# Patient Record
Sex: FEMALE | Race: WHITE | Employment: OTHER | URBAN - METROPOLITAN AREA
[De-identification: names, ages, dates, MRNs, and addresses within clinical notes are randomized per-mention and may not be internally consistent; named-entity substitution may affect disease eponyms.]

---

## 2017-01-01 ENCOUNTER — HOSPITAL ENCOUNTER (OUTPATIENT)
Dept: GENERAL RADIOLOGY | Age: 72
Discharge: HOME | End: 2017-08-09

## 2017-01-01 DIAGNOSIS — J06.9 ACUTE URI: ICD-10-CM

## 2017-01-01 PROCEDURE — 71020 XR CHEST PA LAT: CPT

## 2018-01-01 ENCOUNTER — HOSPITAL ENCOUNTER (OUTPATIENT)
Dept: GENERAL RADIOLOGY | Age: 73
Discharge: HOME | End: 2018-05-18

## 2018-01-01 ENCOUNTER — APPOINTMENT (OUTPATIENT)
Dept: CT IMAGING | Age: 73
DRG: 834 | End: 2018-01-01
Attending: INTERNAL MEDICINE
Payer: MEDICARE

## 2018-01-01 ENCOUNTER — HOSPITAL ENCOUNTER (OUTPATIENT)
Dept: GENERAL RADIOLOGY | Age: 73
Discharge: HOME | End: 2018-04-30
Payer: MEDICARE

## 2018-01-01 ENCOUNTER — APPOINTMENT (OUTPATIENT)
Dept: GENERAL RADIOLOGY | Age: 73
DRG: 834 | End: 2018-01-01
Attending: INTERNAL MEDICINE
Payer: MEDICARE

## 2018-01-01 ENCOUNTER — HOSPITAL ENCOUNTER (OUTPATIENT)
Dept: GENERAL RADIOLOGY | Age: 73
Discharge: HOME | End: 2018-05-25

## 2018-01-01 ENCOUNTER — HOSPITAL ENCOUNTER (INPATIENT)
Age: 73
LOS: 4 days | DRG: 834 | End: 2018-06-01
Attending: EMERGENCY MEDICINE | Admitting: FAMILY MEDICINE
Payer: MEDICARE

## 2018-01-01 ENCOUNTER — APPOINTMENT (OUTPATIENT)
Dept: GENERAL RADIOLOGY | Age: 73
DRG: 834 | End: 2018-01-01
Attending: HOSPITALIST
Payer: MEDICARE

## 2018-01-01 ENCOUNTER — HOSPICE ADMISSION (OUTPATIENT)
Dept: HOSPICE | Facility: HOSPICE | Age: 73
End: 2018-01-01

## 2018-01-01 ENCOUNTER — APPOINTMENT (OUTPATIENT)
Dept: GENERAL RADIOLOGY | Age: 73
DRG: 834 | End: 2018-01-01
Attending: EMERGENCY MEDICINE
Payer: MEDICARE

## 2018-01-01 ENCOUNTER — APPOINTMENT (OUTPATIENT)
Dept: CT IMAGING | Age: 73
DRG: 834 | End: 2018-01-01
Attending: EMERGENCY MEDICINE
Payer: MEDICARE

## 2018-01-01 VITALS
OXYGEN SATURATION: 77 % | WEIGHT: 174.6 LBS | RESPIRATION RATE: 44 BRPM | BODY MASS INDEX: 25.86 KG/M2 | DIASTOLIC BLOOD PRESSURE: 61 MMHG | HEIGHT: 69 IN | HEART RATE: 106 BPM | SYSTOLIC BLOOD PRESSURE: 104 MMHG | TEMPERATURE: 98.1 F

## 2018-01-01 DIAGNOSIS — M54.9 BACK PAIN: ICD-10-CM

## 2018-01-01 DIAGNOSIS — R06.02 SHORTNESS OF BREATH: ICD-10-CM

## 2018-01-01 DIAGNOSIS — Z51.5 DYING CARE: ICD-10-CM

## 2018-01-01 DIAGNOSIS — J18.9 PNEUMONIA OF RIGHT UPPER LOBE DUE TO INFECTIOUS ORGANISM: ICD-10-CM

## 2018-01-01 DIAGNOSIS — C95.92 LEUKEMIA IN RELAPSE, UNSPECIFIED LEUKEMIA TYPE (HCC): ICD-10-CM

## 2018-01-01 DIAGNOSIS — R45.1 AGITATION: ICD-10-CM

## 2018-01-01 DIAGNOSIS — M54.2 NECK PAIN: ICD-10-CM

## 2018-01-01 DIAGNOSIS — R07.9 CHEST PAIN: ICD-10-CM

## 2018-01-01 DIAGNOSIS — R07.9 CHEST PAIN, UNSPECIFIED TYPE: Primary | ICD-10-CM

## 2018-01-01 LAB
ABO + RH BLD: NORMAL
ALBUMIN SERPL-MCNC: 3.2 G/DL (ref 3.5–5)
ALBUMIN SERPL-MCNC: 3.6 G/DL (ref 3.5–5)
ALBUMIN/GLOB SERPL: 1 {RATIO} (ref 1.1–2.2)
ALBUMIN/GLOB SERPL: 1.1 {RATIO} (ref 1.1–2.2)
ALP SERPL-CCNC: 100 U/L (ref 45–117)
ALP SERPL-CCNC: 132 U/L (ref 45–117)
ALT SERPL-CCNC: 40 U/L (ref 12–78)
ALT SERPL-CCNC: 55 U/L (ref 12–78)
ANION GAP SERPL CALC-SCNC: 10 MMOL/L (ref 5–15)
ANION GAP SERPL CALC-SCNC: 11 MMOL/L (ref 5–15)
ANION GAP SERPL CALC-SCNC: 11 MMOL/L (ref 5–15)
ANION GAP SERPL CALC-SCNC: 12 MMOL/L (ref 5–15)
ANION GAP SERPL CALC-SCNC: 15 MMOL/L (ref 5–15)
APTT PPP: 27.5 SEC (ref 22.1–32)
ARTERIAL PATENCY WRIST A: YES
AST SERPL-CCNC: 33 U/L (ref 15–37)
AST SERPL-CCNC: 56 U/L (ref 15–37)
ATRIAL RATE: 74 BPM
BASE EXCESS BLD CALC-SCNC: 0 MMOL/L
BASOPHILS # BLD: 0 K/UL (ref 0–0.1)
BASOPHILS # BLD: 0.1 K/UL (ref 0–0.1)
BASOPHILS NFR BLD: 0 % (ref 0–1)
BASOPHILS NFR BLD: 1 % (ref 0–1)
BDY SITE: ABNORMAL
BILIRUB SERPL-MCNC: 0.4 MG/DL (ref 0.2–1)
BILIRUB SERPL-MCNC: 0.7 MG/DL (ref 0.2–1)
BLASTS NFR BLD MANUAL: 10 %
BLASTS NFR BLD MANUAL: 11 %
BLASTS NFR BLD MANUAL: 12 %
BLASTS NFR BLD MANUAL: 14 %
BLASTS NFR BLD MANUAL: 17 %
BLASTS NFR BLD MANUAL: 3 %
BLASTS NFR BLD MANUAL: 5 %
BLD PROD TYP BPU: NORMAL
BLD PROD TYP BPU: NORMAL
BLOOD GROUP ANTIBODIES SERPL: NORMAL
BPU ID: NORMAL
BPU ID: NORMAL
BUN SERPL-MCNC: 19 MG/DL (ref 6–20)
BUN SERPL-MCNC: 21 MG/DL (ref 6–20)
BUN SERPL-MCNC: 27 MG/DL (ref 6–20)
BUN SERPL-MCNC: 32 MG/DL (ref 6–20)
BUN SERPL-MCNC: 40 MG/DL (ref 6–20)
BUN/CREAT SERPL: 29 (ref 12–20)
BUN/CREAT SERPL: 30 (ref 12–20)
BUN/CREAT SERPL: 40 (ref 12–20)
BUN/CREAT SERPL: 41 (ref 12–20)
BUN/CREAT SERPL: 50 (ref 12–20)
CALCIUM SERPL-MCNC: 8.2 MG/DL (ref 8.5–10.1)
CALCIUM SERPL-MCNC: 8.4 MG/DL (ref 8.5–10.1)
CALCIUM SERPL-MCNC: 8.5 MG/DL (ref 8.5–10.1)
CALCIUM SERPL-MCNC: 8.6 MG/DL (ref 8.5–10.1)
CALCIUM SERPL-MCNC: 8.8 MG/DL (ref 8.5–10.1)
CALCULATED P AXIS, ECG09: 43 DEGREES
CALCULATED R AXIS, ECG10: -10 DEGREES
CALCULATED T AXIS, ECG11: 18 DEGREES
CHLORIDE SERPL-SCNC: 101 MMOL/L (ref 97–108)
CHLORIDE SERPL-SCNC: 101 MMOL/L (ref 97–108)
CHLORIDE SERPL-SCNC: 104 MMOL/L (ref 97–108)
CHLORIDE SERPL-SCNC: 104 MMOL/L (ref 97–108)
CHLORIDE SERPL-SCNC: 99 MMOL/L (ref 97–108)
CK MB CFR SERPL CALC: 5.9 % (ref 0–2.5)
CK MB CFR SERPL CALC: 9.1 % (ref 0–2.5)
CK MB CFR SERPL CALC: ABNORMAL % (ref 0–2.5)
CK MB SERPL-MCNC: 19.8 NG/ML (ref 5–25)
CK MB SERPL-MCNC: 7.1 NG/ML (ref 5–25)
CK MB SERPL-MCNC: <1 NG/ML (ref 5–25)
CK SERPL-CCNC: 120 U/L (ref 26–192)
CK SERPL-CCNC: 21 U/L (ref 26–192)
CK SERPL-CCNC: 218 U/L (ref 26–192)
CO2 SERPL-SCNC: 22 MMOL/L (ref 21–32)
CO2 SERPL-SCNC: 23 MMOL/L (ref 21–32)
CO2 SERPL-SCNC: 23 MMOL/L (ref 21–32)
CO2 SERPL-SCNC: 24 MMOL/L (ref 21–32)
CO2 SERPL-SCNC: 25 MMOL/L (ref 21–32)
CREAT SERPL-MCNC: 0.64 MG/DL (ref 0.55–1.02)
CREAT SERPL-MCNC: 0.65 MG/DL (ref 0.55–1.02)
CREAT SERPL-MCNC: 0.68 MG/DL (ref 0.55–1.02)
CREAT SERPL-MCNC: 0.7 MG/DL (ref 0.55–1.02)
CREAT SERPL-MCNC: 0.97 MG/DL (ref 0.55–1.02)
D DIMER PPP FEU-MCNC: 5.23 MG/L FEU (ref 0–0.65)
DIAGNOSIS, 93000: NORMAL
DIFFERENTIAL METHOD BLD: ABNORMAL
EOSINOPHIL # BLD: 0 K/UL (ref 0–0.4)
EOSINOPHIL # BLD: 0.1 K/UL (ref 0–0.4)
EOSINOPHIL # BLD: 0.1 K/UL (ref 0–0.4)
EOSINOPHIL NFR BLD: 0 % (ref 0–7)
EOSINOPHIL NFR BLD: 1 % (ref 0–7)
EOSINOPHIL NFR BLD: 2 % (ref 0–7)
ERYTHROCYTE [DISTWIDTH] IN BLOOD BY AUTOMATED COUNT: 16.9 % (ref 11.5–14.5)
ERYTHROCYTE [DISTWIDTH] IN BLOOD BY AUTOMATED COUNT: 17 % (ref 11.5–14.5)
ERYTHROCYTE [DISTWIDTH] IN BLOOD BY AUTOMATED COUNT: 17.1 % (ref 11.5–14.5)
ERYTHROCYTE [DISTWIDTH] IN BLOOD BY AUTOMATED COUNT: 17.2 % (ref 11.5–14.5)
ERYTHROCYTE [DISTWIDTH] IN BLOOD BY AUTOMATED COUNT: 17.6 % (ref 11.5–14.5)
ERYTHROCYTE [DISTWIDTH] IN BLOOD BY AUTOMATED COUNT: 17.6 % (ref 11.5–14.5)
ERYTHROCYTE [DISTWIDTH] IN BLOOD BY AUTOMATED COUNT: 17.9 % (ref 11.5–14.5)
FIBRINOGEN PPP-MCNC: 675 MG/DL (ref 200–475)
FOLATE SERPL-MCNC: 5.1 NG/ML (ref 5–21)
GAS FLOW.O2 O2 DELIVERY SYS: ABNORMAL L/MIN
GLOBULIN SER CALC-MCNC: 3.2 G/DL (ref 2–4)
GLOBULIN SER CALC-MCNC: 3.3 G/DL (ref 2–4)
GLUCOSE BLD STRIP.AUTO-MCNC: 164 MG/DL (ref 65–100)
GLUCOSE BLD STRIP.AUTO-MCNC: 181 MG/DL (ref 65–100)
GLUCOSE BLD STRIP.AUTO-MCNC: 185 MG/DL (ref 65–100)
GLUCOSE BLD STRIP.AUTO-MCNC: 187 MG/DL (ref 65–100)
GLUCOSE BLD STRIP.AUTO-MCNC: 188 MG/DL (ref 65–100)
GLUCOSE BLD STRIP.AUTO-MCNC: 195 MG/DL (ref 65–100)
GLUCOSE BLD STRIP.AUTO-MCNC: 197 MG/DL (ref 65–100)
GLUCOSE BLD STRIP.AUTO-MCNC: 199 MG/DL (ref 65–100)
GLUCOSE BLD STRIP.AUTO-MCNC: 202 MG/DL (ref 65–100)
GLUCOSE BLD STRIP.AUTO-MCNC: 210 MG/DL (ref 65–100)
GLUCOSE BLD STRIP.AUTO-MCNC: 211 MG/DL (ref 65–100)
GLUCOSE BLD STRIP.AUTO-MCNC: 218 MG/DL (ref 65–100)
GLUCOSE BLD STRIP.AUTO-MCNC: 219 MG/DL (ref 65–100)
GLUCOSE BLD STRIP.AUTO-MCNC: 225 MG/DL (ref 65–100)
GLUCOSE BLD STRIP.AUTO-MCNC: 228 MG/DL (ref 65–100)
GLUCOSE BLD STRIP.AUTO-MCNC: 241 MG/DL (ref 65–100)
GLUCOSE BLD STRIP.AUTO-MCNC: 252 MG/DL (ref 65–100)
GLUCOSE SERPL-MCNC: 158 MG/DL (ref 65–100)
GLUCOSE SERPL-MCNC: 180 MG/DL (ref 65–100)
GLUCOSE SERPL-MCNC: 189 MG/DL (ref 65–100)
GLUCOSE SERPL-MCNC: 195 MG/DL (ref 65–100)
GLUCOSE SERPL-MCNC: 202 MG/DL (ref 65–100)
HCO3 BLD-SCNC: 23.8 MMOL/L (ref 22–26)
HCT VFR BLD AUTO: 24.3 % (ref 35–47)
HCT VFR BLD AUTO: 25.5 % (ref 35–47)
HCT VFR BLD AUTO: 27.8 % (ref 35–47)
HCT VFR BLD AUTO: 28.6 % (ref 35–47)
HCT VFR BLD AUTO: 28.7 % (ref 35–47)
HCT VFR BLD AUTO: 32.4 % (ref 35–47)
HCT VFR BLD AUTO: 32.4 % (ref 35–47)
HGB BLD-MCNC: 10.5 G/DL (ref 11.5–16)
HGB BLD-MCNC: 10.9 G/DL (ref 11.5–16)
HGB BLD-MCNC: 7.9 G/DL (ref 11.5–16)
HGB BLD-MCNC: 8.3 G/DL (ref 11.5–16)
HGB BLD-MCNC: 9.1 G/DL (ref 11.5–16)
HGB BLD-MCNC: 9.4 G/DL (ref 11.5–16)
HGB BLD-MCNC: 9.4 G/DL (ref 11.5–16)
IMM GRANULOCYTES # BLD: 0 K/UL
IMM GRANULOCYTES NFR BLD AUTO: 0 %
INR PPP: 1.3 (ref 0.9–1.1)
IRON SATN MFR SERPL: 32 % (ref 20–50)
IRON SERPL-MCNC: 88 UG/DL (ref 35–150)
LACTATE SERPL-SCNC: 1.9 MMOL/L (ref 0.4–2)
LACTATE SERPL-SCNC: 2.1 MMOL/L (ref 0.4–2)
LDH SERPL L TO P-CCNC: 917 U/L (ref 81–246)
LYMPHOCYTES # BLD: 1.3 K/UL (ref 0.8–3.5)
LYMPHOCYTES # BLD: 1.4 K/UL (ref 0.8–3.5)
LYMPHOCYTES # BLD: 1.5 K/UL (ref 0.8–3.5)
LYMPHOCYTES # BLD: 1.9 K/UL (ref 0.8–3.5)
LYMPHOCYTES # BLD: 2.2 K/UL (ref 0.8–3.5)
LYMPHOCYTES NFR BLD: 13 % (ref 12–49)
LYMPHOCYTES NFR BLD: 14 % (ref 12–49)
LYMPHOCYTES NFR BLD: 14 % (ref 12–49)
LYMPHOCYTES NFR BLD: 18 % (ref 12–49)
LYMPHOCYTES NFR BLD: 24 % (ref 12–49)
LYMPHOCYTES NFR BLD: 27 % (ref 12–49)
LYMPHOCYTES NFR BLD: 30 % (ref 12–49)
MAGNESIUM SERPL-MCNC: 1.7 MG/DL (ref 1.6–2.4)
MAGNESIUM SERPL-MCNC: 1.7 MG/DL (ref 1.6–2.4)
MCH RBC QN AUTO: 29 PG (ref 26–34)
MCH RBC QN AUTO: 29.2 PG (ref 26–34)
MCH RBC QN AUTO: 29.3 PG (ref 26–34)
MCH RBC QN AUTO: 29.4 PG (ref 26–34)
MCH RBC QN AUTO: 29.5 PG (ref 26–34)
MCH RBC QN AUTO: 29.6 PG (ref 26–34)
MCH RBC QN AUTO: 29.8 PG (ref 26–34)
MCHC RBC AUTO-ENTMCNC: 32.4 G/DL (ref 30–36.5)
MCHC RBC AUTO-ENTMCNC: 32.5 G/DL (ref 30–36.5)
MCHC RBC AUTO-ENTMCNC: 32.5 G/DL (ref 30–36.5)
MCHC RBC AUTO-ENTMCNC: 32.7 G/DL (ref 30–36.5)
MCHC RBC AUTO-ENTMCNC: 32.8 G/DL (ref 30–36.5)
MCHC RBC AUTO-ENTMCNC: 32.9 G/DL (ref 30–36.5)
MCHC RBC AUTO-ENTMCNC: 33.6 G/DL (ref 30–36.5)
MCV RBC AUTO: 87.8 FL (ref 80–99)
MCV RBC AUTO: 88.3 FL (ref 80–99)
MCV RBC AUTO: 89.7 FL (ref 80–99)
MCV RBC AUTO: 90.1 FL (ref 80–99)
MCV RBC AUTO: 90.3 FL (ref 80–99)
MCV RBC AUTO: 90.3 FL (ref 80–99)
MCV RBC AUTO: 91.7 FL (ref 80–99)
METAMYELOCYTES NFR BLD MANUAL: 1 %
METAMYELOCYTES NFR BLD MANUAL: 1 %
MONOCYTES # BLD: 2.7 K/UL (ref 0–1)
MONOCYTES # BLD: 2.7 K/UL (ref 0–1)
MONOCYTES # BLD: 3.4 K/UL (ref 0–1)
MONOCYTES # BLD: 5.2 K/UL (ref 0–1)
MONOCYTES # BLD: 5.4 K/UL (ref 0–1)
MONOCYTES # BLD: 5.8 K/UL (ref 0–1)
MONOCYTES # BLD: 7.4 K/UL (ref 0–1)
MONOCYTES NFR BLD: 46 % (ref 5–13)
MONOCYTES NFR BLD: 47 % (ref 5–13)
MONOCYTES NFR BLD: 49 % (ref 5–13)
MONOCYTES NFR BLD: 50 % (ref 5–13)
MONOCYTES NFR BLD: 56 % (ref 5–13)
MONOCYTES NFR BLD: 59 % (ref 5–13)
MONOCYTES NFR BLD: 73 % (ref 5–13)
MYELOCYTES NFR BLD MANUAL: 1 %
MYELOCYTES NFR BLD MANUAL: 2 %
MYELOCYTES NFR BLD MANUAL: 3 %
NEUTS BAND NFR BLD MANUAL: 1 % (ref 0–6)
NEUTS BAND NFR BLD MANUAL: 2 % (ref 0–6)
NEUTS BAND NFR BLD MANUAL: 4 % (ref 0–6)
NEUTS SEG # BLD: 0.5 K/UL (ref 1.8–8)
NEUTS SEG # BLD: 0.7 K/UL (ref 1.8–8)
NEUTS SEG # BLD: 1 K/UL (ref 1.8–8)
NEUTS SEG # BLD: 1.1 K/UL (ref 1.8–8)
NEUTS SEG # BLD: 1.1 K/UL (ref 1.8–8)
NEUTS SEG NFR BLD: 10 % (ref 32–75)
NEUTS SEG NFR BLD: 15 % (ref 32–75)
NEUTS SEG NFR BLD: 6 % (ref 32–75)
NEUTS SEG NFR BLD: 7 % (ref 32–75)
NEUTS SEG NFR BLD: 7 % (ref 32–75)
NEUTS SEG NFR BLD: 8 % (ref 32–75)
NEUTS SEG NFR BLD: 9 % (ref 32–75)
NRBC # BLD: 0.02 K/UL (ref 0–0.01)
NRBC # BLD: 0.03 K/UL (ref 0–0.01)
NRBC # BLD: 0.04 K/UL (ref 0–0.01)
NRBC # BLD: 0.06 K/UL (ref 0–0.01)
NRBC BLD-RTO: 0.2 PER 100 WBC
NRBC BLD-RTO: 0.3 PER 100 WBC
NRBC BLD-RTO: 0.4 PER 100 WBC
NRBC BLD-RTO: 0.5 PER 100 WBC
NRBC BLD-RTO: 1 PER 100 WBC
O2/TOTAL GAS SETTING VFR VENT: 0.5 %
P-R INTERVAL, ECG05: 146 MS
PATH REV BLD -IMP: ABNORMAL
PCO2 BLD: 32.7 MMHG (ref 35–45)
PEEP RESPIRATORY: 5 CMH2O
PH BLD: 7.47 [PH] (ref 7.35–7.45)
PHOSPHATE SERPL-MCNC: 3.7 MG/DL (ref 2.6–4.7)
PLATELET # BLD AUTO: 21 K/UL (ref 150–400)
PLATELET # BLD AUTO: 21 K/UL (ref 150–400)
PLATELET # BLD AUTO: 31 K/UL (ref 150–400)
PLATELET # BLD AUTO: 36 K/UL (ref 150–400)
PLATELET # BLD AUTO: 40 K/UL (ref 150–400)
PLATELET # BLD AUTO: 53 K/UL (ref 150–400)
PLATELET # BLD AUTO: 58 K/UL (ref 150–400)
PMV BLD AUTO: 10.3 FL (ref 8.9–12.9)
PMV BLD AUTO: 10.3 FL (ref 8.9–12.9)
PMV BLD AUTO: 10.5 FL (ref 8.9–12.9)
PMV BLD AUTO: 10.9 FL (ref 8.9–12.9)
PMV BLD AUTO: 11.1 FL (ref 8.9–12.9)
PMV BLD AUTO: 12.1 FL (ref 8.9–12.9)
PMV BLD AUTO: 12.4 FL (ref 8.9–12.9)
PO2 BLD: 55 MMHG (ref 80–100)
POTASSIUM SERPL-SCNC: 3.3 MMOL/L (ref 3.5–5.1)
POTASSIUM SERPL-SCNC: 3.7 MMOL/L (ref 3.5–5.1)
POTASSIUM SERPL-SCNC: 3.8 MMOL/L (ref 3.5–5.1)
POTASSIUM SERPL-SCNC: 3.9 MMOL/L (ref 3.5–5.1)
POTASSIUM SERPL-SCNC: 4.1 MMOL/L (ref 3.5–5.1)
PROMYELOCYTES NFR BLD MANUAL: 2 %
PROMYELOCYTES NFR BLD MANUAL: 3 %
PROMYELOCYTES NFR BLD MANUAL: 7 %
PROT SERPL-MCNC: 6.4 G/DL (ref 6.4–8.2)
PROT SERPL-MCNC: 6.9 G/DL (ref 6.4–8.2)
PROTHROMBIN TIME: 13.4 SEC (ref 9–11.1)
Q-T INTERVAL, ECG07: 388 MS
QRS DURATION, ECG06: 80 MS
QTC CALCULATION (BEZET), ECG08: 430 MS
RBC # BLD AUTO: 2.65 M/UL (ref 3.8–5.2)
RBC # BLD AUTO: 2.83 M/UL (ref 3.8–5.2)
RBC # BLD AUTO: 3.1 M/UL (ref 3.8–5.2)
RBC # BLD AUTO: 3.18 M/UL (ref 3.8–5.2)
RBC # BLD AUTO: 3.24 M/UL (ref 3.8–5.2)
RBC # BLD AUTO: 3.59 M/UL (ref 3.8–5.2)
RBC # BLD AUTO: 3.69 M/UL (ref 3.8–5.2)
RBC MORPH BLD: ABNORMAL
SAO2 % BLD: 90 % (ref 92–97)
SERVICE CMNT-IMP: ABNORMAL
SODIUM SERPL-SCNC: 134 MMOL/L (ref 136–145)
SODIUM SERPL-SCNC: 134 MMOL/L (ref 136–145)
SODIUM SERPL-SCNC: 138 MMOL/L (ref 136–145)
SODIUM SERPL-SCNC: 139 MMOL/L (ref 136–145)
SODIUM SERPL-SCNC: 140 MMOL/L (ref 136–145)
SPECIMEN EXP DATE BLD: NORMAL
SPECIMEN TYPE: ABNORMAL
STATUS OF UNIT,%ST: NORMAL
STATUS OF UNIT,%ST: NORMAL
T4 FREE SERPL-MCNC: 1.3 NG/DL (ref 0.8–1.5)
THERAPEUTIC RANGE,PTTT: NORMAL SECS (ref 58–77)
TIBC SERPL-MCNC: 275 UG/DL (ref 250–450)
TOTAL RESP. RATE, ITRR: 22
TROPONIN I SERPL-MCNC: 3.16 NG/ML
TROPONIN I SERPL-MCNC: 6.54 NG/ML
TROPONIN I SERPL-MCNC: <0.04 NG/ML
TROPONIN I SERPL-MCNC: <0.04 NG/ML
TSH SERPL DL<=0.05 MIU/L-ACNC: 0.48 UIU/ML (ref 0.36–3.74)
UNIT DIVISION, %UDIV: 0
UNIT DIVISION, %UDIV: 0
VENTRICULAR RATE, ECG03: 74 BPM
VIT B12 SERPL-MCNC: 500 PG/ML (ref 193–986)
WBC # BLD AUTO: 10.2 K/UL (ref 3.6–11)
WBC # BLD AUTO: 10.3 K/UL (ref 3.6–11)
WBC # BLD AUTO: 5.5 K/UL (ref 3.6–11)
WBC # BLD AUTO: 5.8 K/UL (ref 3.6–11)
WBC # BLD AUTO: 7.3 K/UL (ref 3.6–11)
WBC # BLD AUTO: 9.7 K/UL (ref 3.6–11)
WBC # BLD AUTO: 9.9 K/UL (ref 3.6–11)
WBC MORPH BLD: ABNORMAL

## 2018-01-01 PROCEDURE — 74011000258 HC RX REV CODE- 258: Performed by: EMERGENCY MEDICINE

## 2018-01-01 PROCEDURE — 74011000258 HC RX REV CODE- 258: Performed by: HOSPITALIST

## 2018-01-01 PROCEDURE — 74011636637 HC RX REV CODE- 636/637: Performed by: FAMILY MEDICINE

## 2018-01-01 PROCEDURE — 82962 GLUCOSE BLOOD TEST: CPT

## 2018-01-01 PROCEDURE — 74011250637 HC RX REV CODE- 250/637: Performed by: INTERNAL MEDICINE

## 2018-01-01 PROCEDURE — 88374 M/PHMTRC ALYS ISHQUANT/SEMIQ: CPT | Performed by: FAMILY MEDICINE

## 2018-01-01 PROCEDURE — 82607 VITAMIN B-12: CPT | Performed by: INTERNAL MEDICINE

## 2018-01-01 PROCEDURE — 87040 BLOOD CULTURE FOR BACTERIA: CPT | Performed by: HOSPITALIST

## 2018-01-01 PROCEDURE — 30233R1 TRANSFUSION OF NONAUTOLOGOUS PLATELETS INTO PERIPHERAL VEIN, PERCUTANEOUS APPROACH: ICD-10-PCS | Performed by: INTERNAL MEDICINE

## 2018-01-01 PROCEDURE — 36415 COLL VENOUS BLD VENIPUNCTURE: CPT | Performed by: FAMILY MEDICINE

## 2018-01-01 PROCEDURE — 88264 CHROMOSOME ANALYSIS 20-25: CPT | Performed by: FAMILY MEDICINE

## 2018-01-01 PROCEDURE — 36569 INSJ PICC 5 YR+ W/O IMAGING: CPT | Performed by: HOSPITALIST

## 2018-01-01 PROCEDURE — 74011000250 HC RX REV CODE- 250: Performed by: INTERNAL MEDICINE

## 2018-01-01 PROCEDURE — 85025 COMPLETE CBC W/AUTO DIFF WBC: CPT | Performed by: FAMILY MEDICINE

## 2018-01-01 PROCEDURE — 74011250637 HC RX REV CODE- 250/637: Performed by: HOSPITALIST

## 2018-01-01 PROCEDURE — 71046 X-RAY EXAM CHEST 2 VIEWS: CPT

## 2018-01-01 PROCEDURE — 77030027138 HC INCENT SPIROMETER -A

## 2018-01-01 PROCEDURE — 99285 EMERGENCY DEPT VISIT HI MDM: CPT

## 2018-01-01 PROCEDURE — 71275 CT ANGIOGRAPHY CHEST: CPT

## 2018-01-01 PROCEDURE — 88342 IMHCHEM/IMCYTCHM 1ST ANTB: CPT | Performed by: FAMILY MEDICINE

## 2018-01-01 PROCEDURE — 36415 COLL VENOUS BLD VENIPUNCTURE: CPT | Performed by: INTERNAL MEDICINE

## 2018-01-01 PROCEDURE — 84484 ASSAY OF TROPONIN QUANT: CPT | Performed by: HOSPITALIST

## 2018-01-01 PROCEDURE — 74011250636 HC RX REV CODE- 250/636: Performed by: INTERNAL MEDICINE

## 2018-01-01 PROCEDURE — 93306 TTE W/DOPPLER COMPLETE: CPT

## 2018-01-01 PROCEDURE — 77030028872 HC BN BIOP NDL ON CNTRL TY TELE -C

## 2018-01-01 PROCEDURE — 93041 RHYTHM ECG TRACING: CPT

## 2018-01-01 PROCEDURE — 74011250636 HC RX REV CODE- 250/636: Performed by: FAMILY MEDICINE

## 2018-01-01 PROCEDURE — 86900 BLOOD TYPING SEROLOGIC ABO: CPT | Performed by: INTERNAL MEDICINE

## 2018-01-01 PROCEDURE — 96376 TX/PRO/DX INJ SAME DRUG ADON: CPT

## 2018-01-01 PROCEDURE — 88311 DECALCIFY TISSUE: CPT | Performed by: FAMILY MEDICINE

## 2018-01-01 PROCEDURE — 88237 TISSUE CULTURE BONE MARROW: CPT | Performed by: FAMILY MEDICINE

## 2018-01-01 PROCEDURE — 84484 ASSAY OF TROPONIN QUANT: CPT | Performed by: INTERNAL MEDICINE

## 2018-01-01 PROCEDURE — P9047 ALBUMIN (HUMAN), 25%, 50ML: HCPCS | Performed by: HOSPITALIST

## 2018-01-01 PROCEDURE — 74011250636 HC RX REV CODE- 250/636: Performed by: HOSPITALIST

## 2018-01-01 PROCEDURE — 81245 FLT3 GENE: CPT | Performed by: FAMILY MEDICINE

## 2018-01-01 PROCEDURE — 85025 COMPLETE CBC W/AUTO DIFF WBC: CPT | Performed by: INTERNAL MEDICINE

## 2018-01-01 PROCEDURE — 74011250637 HC RX REV CODE- 250/637: Performed by: EMERGENCY MEDICINE

## 2018-01-01 PROCEDURE — 77030020847 HC STATLOK BARD -A

## 2018-01-01 PROCEDURE — 80048 BASIC METABOLIC PNL TOTAL CA: CPT | Performed by: INTERNAL MEDICINE

## 2018-01-01 PROCEDURE — 88185 FLOWCYTOMETRY/TC ADD-ON: CPT | Performed by: FAMILY MEDICINE

## 2018-01-01 PROCEDURE — 74011250636 HC RX REV CODE- 250/636: Performed by: EMERGENCY MEDICINE

## 2018-01-01 PROCEDURE — 85384 FIBRINOGEN ACTIVITY: CPT | Performed by: INTERNAL MEDICINE

## 2018-01-01 PROCEDURE — 88305 TISSUE EXAM BY PATHOLOGIST: CPT | Performed by: FAMILY MEDICINE

## 2018-01-01 PROCEDURE — 72050 X-RAY EXAM NECK SPINE 4/5VWS: CPT

## 2018-01-01 PROCEDURE — 83735 ASSAY OF MAGNESIUM: CPT | Performed by: FAMILY MEDICINE

## 2018-01-01 PROCEDURE — 85730 THROMBOPLASTIN TIME PARTIAL: CPT | Performed by: INTERNAL MEDICINE

## 2018-01-01 PROCEDURE — 77012 CT SCAN FOR NEEDLE BIOPSY: CPT

## 2018-01-01 PROCEDURE — 65270000032 HC RM SEMIPRIVATE

## 2018-01-01 PROCEDURE — 84439 ASSAY OF FREE THYROXINE: CPT | Performed by: INTERNAL MEDICINE

## 2018-01-01 PROCEDURE — 93306 TTE W/DOPPLER COMPLETE: CPT | Performed by: INTERNAL MEDICINE

## 2018-01-01 PROCEDURE — 88280 CHROMOSOME KARYOTYPE STUDY: CPT | Performed by: FAMILY MEDICINE

## 2018-01-01 PROCEDURE — 77030032490 HC SLV COMPR SCD KNE COVD -B

## 2018-01-01 PROCEDURE — 82550 ASSAY OF CK (CPK): CPT | Performed by: HOSPITALIST

## 2018-01-01 PROCEDURE — 85379 FIBRIN DEGRADATION QUANT: CPT | Performed by: EMERGENCY MEDICINE

## 2018-01-01 PROCEDURE — 36415 COLL VENOUS BLD VENIPUNCTURE: CPT | Performed by: HOSPITALIST

## 2018-01-01 PROCEDURE — 80053 COMPREHEN METABOLIC PANEL: CPT | Performed by: FAMILY MEDICINE

## 2018-01-01 PROCEDURE — 36600 WITHDRAWAL OF ARTERIAL BLOOD: CPT

## 2018-01-01 PROCEDURE — 94660 CPAP INITIATION&MGMT: CPT

## 2018-01-01 PROCEDURE — 71100 X-RAY EXAM RIBS UNI 2 VIEWS: CPT

## 2018-01-01 PROCEDURE — 83735 ASSAY OF MAGNESIUM: CPT | Performed by: INTERNAL MEDICINE

## 2018-01-01 PROCEDURE — 74011250637 HC RX REV CODE- 250/637: Performed by: FAMILY MEDICINE

## 2018-01-01 PROCEDURE — 74011250636 HC RX REV CODE- 250/636: Performed by: RADIOLOGY

## 2018-01-01 PROCEDURE — 74011000250 HC RX REV CODE- 250: Performed by: HOSPITALIST

## 2018-01-01 PROCEDURE — 88184 FLOWCYTOMETRY/ TC 1 MARKER: CPT | Performed by: FAMILY MEDICINE

## 2018-01-01 PROCEDURE — 84443 ASSAY THYROID STIM HORMONE: CPT | Performed by: INTERNAL MEDICINE

## 2018-01-01 PROCEDURE — 85025 COMPLETE CBC W/AUTO DIFF WBC: CPT | Performed by: EMERGENCY MEDICINE

## 2018-01-01 PROCEDURE — 81246 FLT3 GENE ANALYSIS: CPT | Performed by: FAMILY MEDICINE

## 2018-01-01 PROCEDURE — 74011250636 HC RX REV CODE- 250/636: Performed by: NURSE PRACTITIONER

## 2018-01-01 PROCEDURE — 83605 ASSAY OF LACTIC ACID: CPT | Performed by: HOSPITALIST

## 2018-01-01 PROCEDURE — 72100 X-RAY EXAM L-S SPINE 2/3 VWS: CPT

## 2018-01-01 PROCEDURE — 81310 NPM1 GENE: CPT | Performed by: FAMILY MEDICINE

## 2018-01-01 PROCEDURE — 77030003666 HC NDL SPINAL BD -A

## 2018-01-01 PROCEDURE — 71045 X-RAY EXAM CHEST 1 VIEW: CPT

## 2018-01-01 PROCEDURE — 82746 ASSAY OF FOLIC ACID SERUM: CPT | Performed by: INTERNAL MEDICINE

## 2018-01-01 PROCEDURE — 74011000258 HC RX REV CODE- 258: Performed by: INTERNAL MEDICINE

## 2018-01-01 PROCEDURE — 80053 COMPREHEN METABOLIC PANEL: CPT | Performed by: EMERGENCY MEDICINE

## 2018-01-01 PROCEDURE — 82550 ASSAY OF CK (CPK): CPT | Performed by: EMERGENCY MEDICINE

## 2018-01-01 PROCEDURE — 88313 SPECIAL STAINS GROUP 2: CPT | Performed by: FAMILY MEDICINE

## 2018-01-01 PROCEDURE — 84100 ASSAY OF PHOSPHORUS: CPT | Performed by: HOSPITALIST

## 2018-01-01 PROCEDURE — 83540 ASSAY OF IRON: CPT | Performed by: FAMILY MEDICINE

## 2018-01-01 PROCEDURE — 83615 LACTATE (LD) (LDH) ENZYME: CPT | Performed by: INTERNAL MEDICINE

## 2018-01-01 PROCEDURE — 77030018719 HC DRSG PTCH ANTIMIC J&J -A

## 2018-01-01 PROCEDURE — C1751 CATH, INF, PER/CENT/MIDLINE: HCPCS

## 2018-01-01 PROCEDURE — 93005 ELECTROCARDIOGRAM TRACING: CPT

## 2018-01-01 PROCEDURE — 5A09357 ASSISTANCE WITH RESPIRATORY VENTILATION, LESS THAN 24 CONSECUTIVE HOURS, CONTINUOUS POSITIVE AIRWAY PRESSURE: ICD-10-PCS | Performed by: INTERNAL MEDICINE

## 2018-01-01 PROCEDURE — 76937 US GUIDE VASCULAR ACCESS: CPT

## 2018-01-01 PROCEDURE — 82550 ASSAY OF CK (CPK): CPT | Performed by: INTERNAL MEDICINE

## 2018-01-01 PROCEDURE — P9035 PLATELET PHERES LEUKOREDUCED: HCPCS | Performed by: INTERNAL MEDICINE

## 2018-01-01 PROCEDURE — 07DR3ZX EXTRACTION OF ILIAC BONE MARROW, PERCUTANEOUS APPROACH, DIAGNOSTIC: ICD-10-PCS | Performed by: RADIOLOGY

## 2018-01-01 PROCEDURE — 02HV33Z INSERTION OF INFUSION DEVICE INTO SUPERIOR VENA CAVA, PERCUTANEOUS APPROACH: ICD-10-PCS | Performed by: INTERNAL MEDICINE

## 2018-01-01 PROCEDURE — 36430 TRANSFUSION BLD/BLD COMPNT: CPT

## 2018-01-01 PROCEDURE — 65610000006 HC RM INTENSIVE CARE

## 2018-01-01 PROCEDURE — 96375 TX/PRO/DX INJ NEW DRUG ADDON: CPT

## 2018-01-01 PROCEDURE — 85610 PROTHROMBIN TIME: CPT | Performed by: INTERNAL MEDICINE

## 2018-01-01 PROCEDURE — 81218 CEBPA GENE FULL SEQUENCE: CPT | Performed by: FAMILY MEDICINE

## 2018-01-01 PROCEDURE — 84484 ASSAY OF TROPONIN QUANT: CPT | Performed by: EMERGENCY MEDICINE

## 2018-01-01 PROCEDURE — 85097 BONE MARROW INTERPRETATION: CPT | Performed by: FAMILY MEDICINE

## 2018-01-01 PROCEDURE — 82803 BLOOD GASES ANY COMBINATION: CPT

## 2018-01-01 PROCEDURE — 85025 COMPLETE CBC W/AUTO DIFF WBC: CPT | Performed by: HOSPITALIST

## 2018-01-01 PROCEDURE — 96374 THER/PROPH/DIAG INJ IV PUSH: CPT

## 2018-01-01 PROCEDURE — 74011250636 HC RX REV CODE- 250/636

## 2018-01-01 PROCEDURE — 74011636320 HC RX REV CODE- 636/320: Performed by: EMERGENCY MEDICINE

## 2018-01-01 RX ORDER — LORAZEPAM 2 MG/ML
1 INJECTION INTRAMUSCULAR
Status: DISCONTINUED | OUTPATIENT
Start: 2018-01-01 | End: 2018-01-01

## 2018-01-01 RX ORDER — CYCLOBENZAPRINE HCL 10 MG
10 TABLET ORAL
Status: DISCONTINUED | OUTPATIENT
Start: 2018-01-01 | End: 2018-01-01

## 2018-01-01 RX ORDER — MORPHINE SULFATE 1 MG/ML
2 INJECTION, SOLUTION EPIDURAL; INTRATHECAL; INTRAVENOUS
Status: DISCONTINUED | OUTPATIENT
Start: 2018-01-01 | End: 2018-01-01

## 2018-01-01 RX ORDER — NITROFURANTOIN MACROCRYSTALS 50 MG/1
CAPSULE ORAL
Refills: 0 | COMMUNITY
Start: 2018-01-01

## 2018-01-01 RX ORDER — VANCOMYCIN HYDROCHLORIDE
1250 EVERY 12 HOURS
Status: DISCONTINUED | OUTPATIENT
Start: 2018-01-01 | End: 2018-01-01

## 2018-01-01 RX ORDER — HYDROMORPHONE HYDROCHLORIDE 1 MG/ML
INJECTION, SOLUTION INTRAMUSCULAR; INTRAVENOUS; SUBCUTANEOUS
Status: COMPLETED
Start: 2018-01-01 | End: 2018-01-01

## 2018-01-01 RX ORDER — SODIUM CHLORIDE 0.9 % (FLUSH) 0.9 %
5-10 SYRINGE (ML) INJECTION AS NEEDED
Status: DISCONTINUED | OUTPATIENT
Start: 2018-01-01 | End: 2018-01-01 | Stop reason: HOSPADM

## 2018-01-01 RX ORDER — MORPHINE SULFATE 10 MG/ML
2 INJECTION, SOLUTION INTRAMUSCULAR; INTRAVENOUS
Status: DISCONTINUED | OUTPATIENT
Start: 2018-01-01 | End: 2018-01-01 | Stop reason: SDUPTHER

## 2018-01-01 RX ORDER — OXYCODONE AND ACETAMINOPHEN 5; 325 MG/1; MG/1
1 TABLET ORAL
Status: DISCONTINUED | OUTPATIENT
Start: 2018-01-01 | End: 2018-01-01

## 2018-01-01 RX ORDER — AMOXICILLIN 250 MG
2 CAPSULE ORAL DAILY
Status: DISCONTINUED | OUTPATIENT
Start: 2018-01-01 | End: 2018-01-01

## 2018-01-01 RX ORDER — ASPIRIN 81 MG/1
81 TABLET ORAL DAILY
Status: DISCONTINUED | OUTPATIENT
Start: 2018-01-01 | End: 2018-01-01

## 2018-01-01 RX ORDER — LORAZEPAM 2 MG/ML
1 INJECTION INTRAMUSCULAR
Status: DISCONTINUED | OUTPATIENT
Start: 2018-01-01 | End: 2018-01-01 | Stop reason: HOSPADM

## 2018-01-01 RX ORDER — DIPHENOXYLATE HYDROCHLORIDE AND ATROPINE SULFATE 2.5; .025 MG/1; MG/1
TABLET ORAL
COMMUNITY

## 2018-01-01 RX ORDER — VANCOMYCIN 1.75 GRAM/500 ML IN 0.9 % SODIUM CHLORIDE INTRAVENOUS
1750 ONCE
Status: COMPLETED | OUTPATIENT
Start: 2018-01-01 | End: 2018-01-01

## 2018-01-01 RX ORDER — LIDOCAINE HYDROCHLORIDE 10 MG/ML
10 INJECTION INFILTRATION; PERINEURAL
Status: COMPLETED | OUTPATIENT
Start: 2018-01-01 | End: 2018-01-01

## 2018-01-01 RX ORDER — SODIUM CHLORIDE 0.9 % (FLUSH) 0.9 %
5-10 SYRINGE (ML) INJECTION AS NEEDED
Status: CANCELLED | OUTPATIENT
Start: 2018-01-01

## 2018-01-01 RX ORDER — HYDROMORPHONE HYDROCHLORIDE 2 MG/ML
0.5 INJECTION, SOLUTION INTRAMUSCULAR; INTRAVENOUS; SUBCUTANEOUS
Status: COMPLETED | OUTPATIENT
Start: 2018-01-01 | End: 2018-01-01

## 2018-01-01 RX ORDER — FENTANYL CITRATE 50 UG/ML
25 INJECTION, SOLUTION INTRAMUSCULAR; INTRAVENOUS
Status: COMPLETED | OUTPATIENT
Start: 2018-01-01 | End: 2018-01-01

## 2018-01-01 RX ORDER — AZACITIDINE 100 MG/1
95 INJECTION, POWDER, LYOPHILIZED, FOR SOLUTION INTRAVENOUS; SUBCUTANEOUS EVERY 24 HOURS
Status: DISCONTINUED | OUTPATIENT
Start: 2018-01-01 | End: 2018-01-01

## 2018-01-01 RX ORDER — CARVEDILOL 3.12 MG/1
3.12 TABLET ORAL 2 TIMES DAILY WITH MEALS
COMMUNITY

## 2018-01-01 RX ORDER — CLOPIDOGREL BISULFATE 75 MG/1
300-600 TABLET ORAL AS NEEDED
Status: CANCELLED | OUTPATIENT
Start: 2018-01-01

## 2018-01-01 RX ORDER — LORAZEPAM 2 MG/ML
INJECTION INTRAMUSCULAR
Status: COMPLETED
Start: 2018-01-01 | End: 2018-01-01

## 2018-01-01 RX ORDER — FENTANYL CITRATE 50 UG/ML
25 INJECTION, SOLUTION INTRAMUSCULAR; INTRAVENOUS ONCE
Status: COMPLETED | OUTPATIENT
Start: 2018-01-01 | End: 2018-01-01

## 2018-01-01 RX ORDER — FENTANYL CITRATE 50 UG/ML
200 INJECTION, SOLUTION INTRAMUSCULAR; INTRAVENOUS
Status: DISCONTINUED | OUTPATIENT
Start: 2018-01-01 | End: 2018-01-01

## 2018-01-01 RX ORDER — GLYCOPYRROLATE 0.2 MG/ML
0.2 INJECTION INTRAMUSCULAR; INTRAVENOUS
Status: DISCONTINUED | OUTPATIENT
Start: 2018-01-01 | End: 2018-01-01 | Stop reason: HOSPADM

## 2018-01-01 RX ORDER — FUROSEMIDE 10 MG/ML
40 INJECTION INTRAMUSCULAR; INTRAVENOUS ONCE
Status: COMPLETED | OUTPATIENT
Start: 2018-01-01 | End: 2018-01-01

## 2018-01-01 RX ORDER — SODIUM CHLORIDE 0.9 % (FLUSH) 0.9 %
10 SYRINGE (ML) INJECTION EVERY 24 HOURS
Status: DISCONTINUED | OUTPATIENT
Start: 2018-01-01 | End: 2018-01-01

## 2018-01-01 RX ORDER — HYDROMORPHONE HYDROCHLORIDE 2 MG/ML
2 INJECTION, SOLUTION INTRAMUSCULAR; INTRAVENOUS; SUBCUTANEOUS
Status: DISCONTINUED | OUTPATIENT
Start: 2018-01-01 | End: 2018-01-01 | Stop reason: HOSPADM

## 2018-01-01 RX ORDER — EPTIFIBATIDE 0.75 MG/ML
2 INJECTION, SOLUTION INTRAVENOUS
Status: CANCELLED | OUTPATIENT
Start: 2018-01-01

## 2018-01-01 RX ORDER — HEPARIN SODIUM 200 [USP'U]/100ML
2000 INJECTION, SOLUTION INTRAVENOUS AS NEEDED
Status: CANCELLED | OUTPATIENT
Start: 2018-01-01

## 2018-01-01 RX ORDER — INSULIN LISPRO 100 [IU]/ML
INJECTION, SOLUTION INTRAVENOUS; SUBCUTANEOUS
Status: DISCONTINUED | OUTPATIENT
Start: 2018-01-01 | End: 2018-01-01

## 2018-01-01 RX ORDER — SODIUM CHLORIDE 0.9 % (FLUSH) 0.9 %
20 SYRINGE (ML) INJECTION AS NEEDED
Status: DISCONTINUED | OUTPATIENT
Start: 2018-01-01 | End: 2018-01-01 | Stop reason: HOSPADM

## 2018-01-01 RX ORDER — ALBUMIN HUMAN 250 G/1000ML
25 SOLUTION INTRAVENOUS EVERY 6 HOURS
Status: DISCONTINUED | OUTPATIENT
Start: 2018-01-01 | End: 2018-01-01

## 2018-01-01 RX ORDER — AMLODIPINE BESYLATE 5 MG/1
5 TABLET ORAL DAILY
Status: DISCONTINUED | OUTPATIENT
Start: 2018-01-01 | End: 2018-01-01

## 2018-01-01 RX ORDER — ACETAMINOPHEN 325 MG/1
650 TABLET ORAL EVERY 6 HOURS
Status: DISCONTINUED | OUTPATIENT
Start: 2018-01-01 | End: 2018-01-01 | Stop reason: HOSPADM

## 2018-01-01 RX ORDER — FENTANYL CITRATE 50 UG/ML
25-200 INJECTION, SOLUTION INTRAMUSCULAR; INTRAVENOUS AS NEEDED
Status: CANCELLED | OUTPATIENT
Start: 2018-01-01

## 2018-01-01 RX ORDER — CLONAZEPAM 1 MG/1
0.5 TABLET ORAL
Status: DISCONTINUED | OUTPATIENT
Start: 2018-01-01 | End: 2018-01-01

## 2018-01-01 RX ORDER — LIDOCAINE 50 MG/G
2 PATCH TOPICAL EVERY 24 HOURS
Status: DISCONTINUED | OUTPATIENT
Start: 2018-01-01 | End: 2018-01-01 | Stop reason: HOSPADM

## 2018-01-01 RX ORDER — SODIUM CHLORIDE 9 MG/ML
25 INJECTION, SOLUTION INTRAVENOUS CONTINUOUS
Status: DISCONTINUED | OUTPATIENT
Start: 2018-01-01 | End: 2018-01-01

## 2018-01-01 RX ORDER — OXYCODONE HYDROCHLORIDE 5 MG/1
10 TABLET ORAL
Status: DISCONTINUED | OUTPATIENT
Start: 2018-01-01 | End: 2018-01-01

## 2018-01-01 RX ORDER — SODIUM CHLORIDE 9 MG/ML
1.5 INJECTION, SOLUTION INTRAVENOUS CONTINUOUS
Status: CANCELLED | OUTPATIENT
Start: 2018-01-01 | End: 2018-01-01

## 2018-01-01 RX ORDER — FENOFIBRATE 160 MG/1
160 TABLET ORAL DAILY
COMMUNITY

## 2018-01-01 RX ORDER — SODIUM CHLORIDE 0.9 % (FLUSH) 0.9 %
10 SYRINGE (ML) INJECTION
Status: COMPLETED | OUTPATIENT
Start: 2018-01-01 | End: 2018-01-01

## 2018-01-01 RX ORDER — FUROSEMIDE 10 MG/ML
INJECTION INTRAMUSCULAR; INTRAVENOUS
Status: DISPENSED
Start: 2018-01-01 | End: 2018-01-01

## 2018-01-01 RX ORDER — OXYCODONE AND ACETAMINOPHEN 5; 325 MG/1; MG/1
2 TABLET ORAL
Status: DISCONTINUED | OUTPATIENT
Start: 2018-01-01 | End: 2018-01-01

## 2018-01-01 RX ORDER — SODIUM CHLORIDE 9 MG/ML
250 INJECTION, SOLUTION INTRAVENOUS AS NEEDED
Status: DISCONTINUED | OUTPATIENT
Start: 2018-01-01 | End: 2018-01-01 | Stop reason: HOSPADM

## 2018-01-01 RX ORDER — SODIUM CHLORIDE 9 MG/ML
3 INJECTION, SOLUTION INTRAVENOUS CONTINUOUS
Status: CANCELLED | OUTPATIENT
Start: 2018-01-01 | End: 2018-01-01

## 2018-01-01 RX ORDER — METHYLPREDNISOLONE 4 MG/1
TABLET ORAL
COMMUNITY

## 2018-01-01 RX ORDER — GUAIFENESIN 100 MG/5ML
162 LIQUID (ML) ORAL
Status: COMPLETED | OUTPATIENT
Start: 2018-01-01 | End: 2018-01-01

## 2018-01-01 RX ORDER — DIPHENHYDRAMINE HYDROCHLORIDE 50 MG/ML
12.5 INJECTION, SOLUTION INTRAMUSCULAR; INTRAVENOUS
Status: DISCONTINUED | OUTPATIENT
Start: 2018-01-01 | End: 2018-01-01 | Stop reason: HOSPADM

## 2018-01-01 RX ORDER — POTASSIUM CHLORIDE 7.45 MG/ML
10 INJECTION INTRAVENOUS
Status: DISCONTINUED | OUTPATIENT
Start: 2018-01-01 | End: 2018-01-01

## 2018-01-01 RX ORDER — LOSARTAN POTASSIUM 50 MG/1
100 TABLET ORAL DAILY
Status: DISCONTINUED | OUTPATIENT
Start: 2018-01-01 | End: 2018-01-01

## 2018-01-01 RX ORDER — CYCLOBENZAPRINE HCL 10 MG
10 TABLET ORAL
COMMUNITY

## 2018-01-01 RX ORDER — SERTRALINE HYDROCHLORIDE 100 MG/1
TABLET, FILM COATED ORAL
Refills: 0 | COMMUNITY
Start: 2018-01-01

## 2018-01-01 RX ORDER — SODIUM CHLORIDE 0.9 % (FLUSH) 0.9 %
10 SYRINGE (ML) INJECTION AS NEEDED
Status: DISCONTINUED | OUTPATIENT
Start: 2018-01-01 | End: 2018-01-01

## 2018-01-01 RX ORDER — KETOROLAC TROMETHAMINE 30 MG/ML
15 INJECTION, SOLUTION INTRAMUSCULAR; INTRAVENOUS
Status: COMPLETED | OUTPATIENT
Start: 2018-01-01 | End: 2018-01-01

## 2018-01-01 RX ORDER — IPRATROPIUM BROMIDE AND ALBUTEROL SULFATE 2.5; .5 MG/3ML; MG/3ML
3 SOLUTION RESPIRATORY (INHALATION)
Status: DISCONTINUED | OUTPATIENT
Start: 2018-01-01 | End: 2018-01-01 | Stop reason: HOSPADM

## 2018-01-01 RX ORDER — ACETAMINOPHEN 325 MG/1
650 TABLET ORAL
Status: DISCONTINUED | OUTPATIENT
Start: 2018-01-01 | End: 2018-01-01

## 2018-01-01 RX ORDER — LORAZEPAM 2 MG/ML
0.5 INJECTION INTRAMUSCULAR ONCE
Status: COMPLETED | OUTPATIENT
Start: 2018-01-01 | End: 2018-01-01

## 2018-01-01 RX ORDER — LEVOFLOXACIN 5 MG/ML
750 INJECTION, SOLUTION INTRAVENOUS EVERY 24 HOURS
Status: DISCONTINUED | OUTPATIENT
Start: 2018-01-01 | End: 2018-01-01

## 2018-01-01 RX ORDER — EZETIMIBE 10 MG/1
TABLET ORAL
Refills: 0 | COMMUNITY
Start: 2018-01-01

## 2018-01-01 RX ORDER — FUROSEMIDE 10 MG/ML
80 INJECTION INTRAMUSCULAR; INTRAVENOUS EVERY 12 HOURS
Status: DISCONTINUED | OUTPATIENT
Start: 2018-01-01 | End: 2018-01-01 | Stop reason: HOSPADM

## 2018-01-01 RX ORDER — PANTOPRAZOLE SODIUM 40 MG/1
40 TABLET, DELAYED RELEASE ORAL DAILY
Status: DISCONTINUED | OUTPATIENT
Start: 2018-01-01 | End: 2018-01-01

## 2018-01-01 RX ORDER — MIDAZOLAM HYDROCHLORIDE 1 MG/ML
.5-1 INJECTION, SOLUTION INTRAMUSCULAR; INTRAVENOUS AS NEEDED
Status: CANCELLED | OUTPATIENT
Start: 2018-01-01

## 2018-01-01 RX ORDER — BENZONATATE 200 MG/1
200 CAPSULE ORAL
COMMUNITY

## 2018-01-01 RX ORDER — ONDANSETRON 4 MG/1
4 TABLET, ORALLY DISINTEGRATING ORAL
Status: DISCONTINUED | OUTPATIENT
Start: 2018-01-01 | End: 2018-01-01 | Stop reason: HOSPADM

## 2018-01-01 RX ORDER — LOSARTAN POTASSIUM 50 MG/1
50 TABLET ORAL DAILY
Status: DISCONTINUED | OUTPATIENT
Start: 2018-01-01 | End: 2018-01-01

## 2018-01-01 RX ORDER — FUROSEMIDE 10 MG/ML
40 INJECTION INTRAMUSCULAR; INTRAVENOUS ONCE
Status: DISCONTINUED | OUTPATIENT
Start: 2018-01-01 | End: 2018-01-01 | Stop reason: SDUPTHER

## 2018-01-01 RX ORDER — CEFDINIR 300 MG/1
CAPSULE ORAL
Refills: 0 | COMMUNITY
Start: 2018-01-01

## 2018-01-01 RX ORDER — LIDOCAINE HYDROCHLORIDE 10 MG/ML
10-30 INJECTION INFILTRATION; PERINEURAL
Status: CANCELLED | OUTPATIENT
Start: 2018-01-01

## 2018-01-01 RX ORDER — GRANISETRON HYDROCHLORIDE 1 MG/ML
1 INJECTION INTRAVENOUS EVERY 24 HOURS
Status: DISCONTINUED | OUTPATIENT
Start: 2018-01-01 | End: 2018-01-01

## 2018-01-01 RX ORDER — CARVEDILOL 3.12 MG/1
3.12 TABLET ORAL 2 TIMES DAILY WITH MEALS
Status: DISCONTINUED | OUTPATIENT
Start: 2018-01-01 | End: 2018-01-01

## 2018-01-01 RX ORDER — SODIUM CHLORIDE 0.9 % (FLUSH) 0.9 %
5-10 SYRINGE (ML) INJECTION EVERY 8 HOURS
Status: DISCONTINUED | OUTPATIENT
Start: 2018-01-01 | End: 2018-01-01

## 2018-01-01 RX ORDER — SERTRALINE HYDROCHLORIDE 50 MG/1
100 TABLET, FILM COATED ORAL 2 TIMES DAILY
Status: DISCONTINUED | OUTPATIENT
Start: 2018-01-01 | End: 2018-01-01

## 2018-01-01 RX ORDER — MAGNESIUM SULFATE 100 %
4 CRYSTALS MISCELLANEOUS AS NEEDED
Status: DISCONTINUED | OUTPATIENT
Start: 2018-01-01 | End: 2018-01-01 | Stop reason: HOSPADM

## 2018-01-01 RX ORDER — HYDROMORPHONE HCL/0.9% NACL/PF 0.5 MG/ML
PLASTIC BAG, INJECTION (ML) INTRAVENOUS CONTINUOUS
Status: DISCONTINUED | OUTPATIENT
Start: 2018-01-01 | End: 2018-01-01

## 2018-01-01 RX ORDER — DEXTROSE 50 % IN WATER (D50W) INTRAVENOUS SYRINGE
12.5-25 AS NEEDED
Status: DISCONTINUED | OUTPATIENT
Start: 2018-01-01 | End: 2018-01-01 | Stop reason: HOSPADM

## 2018-01-01 RX ORDER — DOBUTAMINE HYDROCHLORIDE 200 MG/100ML
5 INJECTION INTRAVENOUS CONTINUOUS
Status: DISCONTINUED | OUTPATIENT
Start: 2018-01-01 | End: 2018-01-01

## 2018-01-01 RX ORDER — SODIUM CHLORIDE 0.9 % (FLUSH) 0.9 %
10 SYRINGE (ML) INJECTION EVERY 8 HOURS
Status: DISCONTINUED | OUTPATIENT
Start: 2018-01-01 | End: 2018-01-01

## 2018-01-01 RX ORDER — HYDROMORPHONE HYDROCHLORIDE 2 MG/ML
4 INJECTION, SOLUTION INTRAMUSCULAR; INTRAVENOUS; SUBCUTANEOUS ONCE
Status: DISCONTINUED | OUTPATIENT
Start: 2018-01-01 | End: 2018-01-01 | Stop reason: HOSPADM

## 2018-01-01 RX ORDER — NALOXONE HYDROCHLORIDE 0.4 MG/ML
0.4 INJECTION, SOLUTION INTRAMUSCULAR; INTRAVENOUS; SUBCUTANEOUS AS NEEDED
Status: DISCONTINUED | OUTPATIENT
Start: 2018-01-01 | End: 2018-01-01 | Stop reason: HOSPADM

## 2018-01-01 RX ORDER — ADHESIVE BANDAGE
30 BANDAGE TOPICAL DAILY PRN
Status: DISCONTINUED | OUTPATIENT
Start: 2018-01-01 | End: 2018-01-01

## 2018-01-01 RX ORDER — MIDAZOLAM HYDROCHLORIDE 1 MG/ML
5 INJECTION, SOLUTION INTRAMUSCULAR; INTRAVENOUS
Status: DISCONTINUED | OUTPATIENT
Start: 2018-01-01 | End: 2018-01-01

## 2018-01-01 RX ORDER — HEPARIN SODIUM 1000 [USP'U]/ML
1000-10000 INJECTION, SOLUTION INTRAVENOUS; SUBCUTANEOUS AS NEEDED
Status: CANCELLED | OUTPATIENT
Start: 2018-01-01

## 2018-01-01 RX ORDER — LORAZEPAM 2 MG/ML
INJECTION INTRAMUSCULAR
Status: DISCONTINUED
Start: 2018-01-01 | End: 2018-01-01 | Stop reason: HOSPADM

## 2018-01-01 RX ORDER — CLONAZEPAM 0.5 MG/1
TABLET ORAL
COMMUNITY

## 2018-01-01 RX ORDER — ATROPINE SULFATE 0.1 MG/ML
.5-1 INJECTION INTRAVENOUS AS NEEDED
Status: CANCELLED | OUTPATIENT
Start: 2018-01-01

## 2018-01-01 RX ORDER — BACITRACIN 500 UNIT/G
1 PACKET (EA) TOPICAL AS NEEDED
Status: DISCONTINUED | OUTPATIENT
Start: 2018-01-01 | End: 2018-01-01 | Stop reason: HOSPADM

## 2018-01-01 RX ORDER — AZACITIDINE 100 MG/1
95 INJECTION, POWDER, LYOPHILIZED, FOR SOLUTION INTRAVENOUS; SUBCUTANEOUS EVERY 24 HOURS
Status: DISCONTINUED | OUTPATIENT
Start: 2018-06-05 | End: 2018-01-01

## 2018-01-01 RX ORDER — KETOROLAC TROMETHAMINE 30 MG/ML
15 INJECTION, SOLUTION INTRAMUSCULAR; INTRAVENOUS ONCE
Status: COMPLETED | OUTPATIENT
Start: 2018-01-01 | End: 2018-01-01

## 2018-01-01 RX ORDER — AMLODIPINE BESYLATE 5 MG/1
5 TABLET ORAL DAILY
COMMUNITY

## 2018-01-01 RX ORDER — HYDROMORPHONE HYDROCHLORIDE 2 MG/ML
2 INJECTION, SOLUTION INTRAMUSCULAR; INTRAVENOUS; SUBCUTANEOUS
Status: DISCONTINUED | OUTPATIENT
Start: 2018-01-01 | End: 2018-01-01

## 2018-01-01 RX ADMIN — POTASSIUM CHLORIDE 10 MEQ: 10 INJECTION, SOLUTION INTRAVENOUS at 12:23

## 2018-01-01 RX ADMIN — HYDROMORPHONE HYDROCHLORIDE 2 MG: 2 INJECTION, SOLUTION INTRAMUSCULAR; INTRAVENOUS; SUBCUTANEOUS at 16:36

## 2018-01-01 RX ADMIN — HYDROMORPHONE HYDROCHLORIDE 0.5 MG: 2 INJECTION INTRAMUSCULAR; INTRAVENOUS; SUBCUTANEOUS at 10:50

## 2018-01-01 RX ADMIN — KETOROLAC TROMETHAMINE 15 MG: 30 INJECTION, SOLUTION INTRAMUSCULAR; INTRAVENOUS at 15:30

## 2018-01-01 RX ADMIN — FUROSEMIDE 80 MG: 10 INJECTION, SOLUTION INTRAMUSCULAR; INTRAVENOUS at 11:09

## 2018-01-01 RX ADMIN — MORPHINE SULFATE 2 MG: 10 INJECTION, SOLUTION INTRAMUSCULAR; INTRAVENOUS at 21:15

## 2018-01-01 RX ADMIN — CARVEDILOL 3.12 MG: 3.12 TABLET, FILM COATED ORAL at 18:01

## 2018-01-01 RX ADMIN — OXYCODONE HYDROCHLORIDE 10 MG: 5 TABLET ORAL at 21:00

## 2018-01-01 RX ADMIN — SERTRALINE HYDROCHLORIDE 100 MG: 50 TABLET ORAL at 09:04

## 2018-01-01 RX ADMIN — OXYCODONE HYDROCHLORIDE 10 MG: 5 TABLET ORAL at 15:52

## 2018-01-01 RX ADMIN — CARVEDILOL 3.12 MG: 3.12 TABLET, FILM COATED ORAL at 16:36

## 2018-01-01 RX ADMIN — CLONAZEPAM 0.5 MG: 1 TABLET ORAL at 11:12

## 2018-01-01 RX ADMIN — ACETAMINOPHEN 650 MG: 325 TABLET ORAL at 11:44

## 2018-01-01 RX ADMIN — CLONAZEPAM 0.5 MG: 1 TABLET ORAL at 17:25

## 2018-01-01 RX ADMIN — PIPERACILLIN SODIUM,TAZOBACTAM SODIUM 4.5 G: 4; .5 INJECTION, POWDER, FOR SOLUTION INTRAVENOUS at 00:29

## 2018-01-01 RX ADMIN — SERTRALINE HYDROCHLORIDE 100 MG: 50 TABLET ORAL at 17:24

## 2018-01-01 RX ADMIN — Medication 10 ML: at 13:37

## 2018-01-01 RX ADMIN — ALBUMIN (HUMAN) 25 G: 0.25 INJECTION, SOLUTION INTRAVENOUS at 05:37

## 2018-01-01 RX ADMIN — AZACITIDINE 95 MG: 100 INJECTION, POWDER, LYOPHILIZED, FOR SOLUTION INTRAVENOUS; SUBCUTANEOUS at 16:47

## 2018-01-01 RX ADMIN — Medication 10 ML: at 21:43

## 2018-01-01 RX ADMIN — KETOROLAC TROMETHAMINE 15 MG: 30 INJECTION, SOLUTION INTRAMUSCULAR at 02:36

## 2018-01-01 RX ADMIN — ACETAMINOPHEN 650 MG: 325 TABLET ORAL at 05:59

## 2018-01-01 RX ADMIN — Medication 10 ML: at 15:30

## 2018-01-01 RX ADMIN — Medication 10 ML: at 15:55

## 2018-01-01 RX ADMIN — ALBUMIN (HUMAN) 25 G: 0.25 INJECTION, SOLUTION INTRAVENOUS at 18:02

## 2018-01-01 RX ADMIN — MORPHINE SULFATE 2 MG: 10 INJECTION, SOLUTION INTRAMUSCULAR; INTRAVENOUS at 21:24

## 2018-01-01 RX ADMIN — MORPHINE SULFATE 2 MG: 10 INJECTION, SOLUTION INTRAMUSCULAR; INTRAVENOUS at 13:18

## 2018-01-01 RX ADMIN — INSULIN LISPRO 2 UNITS: 100 INJECTION, SOLUTION INTRAVENOUS; SUBCUTANEOUS at 16:36

## 2018-01-01 RX ADMIN — INSULIN LISPRO 3 UNITS: 100 INJECTION, SOLUTION INTRAVENOUS; SUBCUTANEOUS at 08:15

## 2018-01-01 RX ADMIN — FENTANYL CITRATE 25 MCG: 50 INJECTION, SOLUTION INTRAMUSCULAR; INTRAVENOUS at 06:34

## 2018-01-01 RX ADMIN — AMLODIPINE BESYLATE 5 MG: 5 TABLET ORAL at 08:48

## 2018-01-01 RX ADMIN — ACETAMINOPHEN 650 MG: 325 TABLET ORAL at 06:31

## 2018-01-01 RX ADMIN — LOSARTAN POTASSIUM 100 MG: 50 TABLET, FILM COATED ORAL at 08:48

## 2018-01-01 RX ADMIN — CARVEDILOL 3.12 MG: 3.12 TABLET, FILM COATED ORAL at 18:14

## 2018-01-01 RX ADMIN — FENTANYL CITRATE 25 MCG: 50 INJECTION, SOLUTION INTRAMUSCULAR; INTRAVENOUS at 09:24

## 2018-01-01 RX ADMIN — PIPERACILLIN SODIUM,TAZOBACTAM SODIUM 4.5 G: 4; .5 INJECTION, POWDER, FOR SOLUTION INTRAVENOUS at 18:11

## 2018-01-01 RX ADMIN — PANTOPRAZOLE SODIUM 40 MG: 40 TABLET, DELAYED RELEASE ORAL at 10:08

## 2018-01-01 RX ADMIN — AZACITIDINE 95 MG: 100 INJECTION, POWDER, LYOPHILIZED, FOR SOLUTION INTRAVENOUS; SUBCUTANEOUS at 16:48

## 2018-01-01 RX ADMIN — ASPIRIN 81 MG: 81 TABLET, COATED ORAL at 09:03

## 2018-01-01 RX ADMIN — Medication 10 ML: at 10:11

## 2018-01-01 RX ADMIN — INSULIN LISPRO 3 UNITS: 100 INJECTION, SOLUTION INTRAVENOUS; SUBCUTANEOUS at 17:01

## 2018-01-01 RX ADMIN — POTASSIUM CHLORIDE 10 MEQ: 10 INJECTION, SOLUTION INTRAVENOUS at 11:19

## 2018-01-01 RX ADMIN — SERTRALINE HYDROCHLORIDE 100 MG: 50 TABLET ORAL at 08:48

## 2018-01-01 RX ADMIN — MORPHINE SULFATE 2 MG: 10 INJECTION, SOLUTION INTRAMUSCULAR; INTRAVENOUS at 19:21

## 2018-01-01 RX ADMIN — PANTOPRAZOLE SODIUM 40 MG: 40 TABLET, DELAYED RELEASE ORAL at 08:48

## 2018-01-01 RX ADMIN — MIDAZOLAM HYDROCHLORIDE 0.5 MG: 1 INJECTION, SOLUTION INTRAMUSCULAR; INTRAVENOUS at 08:57

## 2018-01-01 RX ADMIN — INSULIN LISPRO 3 UNITS: 100 INJECTION, SOLUTION INTRAVENOUS; SUBCUTANEOUS at 21:42

## 2018-01-01 RX ADMIN — Medication 10 ML: at 07:35

## 2018-01-01 RX ADMIN — VANCOMYCIN HYDROCHLORIDE 1750 MG: 10 INJECTION, POWDER, LYOPHILIZED, FOR SOLUTION INTRAVENOUS at 23:14

## 2018-01-01 RX ADMIN — INSULIN LISPRO 3 UNITS: 100 INJECTION, SOLUTION INTRAVENOUS; SUBCUTANEOUS at 12:20

## 2018-01-01 RX ADMIN — INSULIN LISPRO 2 UNITS: 100 INJECTION, SOLUTION INTRAVENOUS; SUBCUTANEOUS at 12:47

## 2018-01-01 RX ADMIN — ACETAMINOPHEN 650 MG: 325 TABLET ORAL at 18:01

## 2018-01-01 RX ADMIN — ACETAMINOPHEN 650 MG: 325 TABLET ORAL at 23:30

## 2018-01-01 RX ADMIN — ACETAMINOPHEN 650 MG: 325 TABLET ORAL at 11:12

## 2018-01-01 RX ADMIN — INSULIN LISPRO 3 UNITS: 100 INJECTION, SOLUTION INTRAVENOUS; SUBCUTANEOUS at 11:43

## 2018-01-01 RX ADMIN — AMLODIPINE BESYLATE 5 MG: 5 TABLET ORAL at 09:04

## 2018-01-01 RX ADMIN — FENTANYL CITRATE 50 MCG: 50 INJECTION, SOLUTION INTRAMUSCULAR; INTRAVENOUS at 08:58

## 2018-01-01 RX ADMIN — STANDARDIZED SENNA CONCENTRATE AND DOCUSATE SODIUM 2 TABLET: 8.6; 5 TABLET, FILM COATED ORAL at 13:37

## 2018-01-01 RX ADMIN — NITROGLYCERIN 1 INCH: 20 OINTMENT TOPICAL at 19:07

## 2018-01-01 RX ADMIN — LORAZEPAM 1 MG: 2 INJECTION INTRAMUSCULAR at 11:52

## 2018-01-01 RX ADMIN — CYCLOBENZAPRINE HYDROCHLORIDE 10 MG: 10 TABLET, FILM COATED ORAL at 06:33

## 2018-01-01 RX ADMIN — LOSARTAN POTASSIUM 100 MG: 50 TABLET, FILM COATED ORAL at 10:08

## 2018-01-01 RX ADMIN — Medication 10 ML: at 15:44

## 2018-01-01 RX ADMIN — ACETAMINOPHEN 650 MG: 325 TABLET ORAL at 17:24

## 2018-01-01 RX ADMIN — Medication 10 ML: at 08:47

## 2018-01-01 RX ADMIN — CARVEDILOL 3.12 MG: 3.12 TABLET, FILM COATED ORAL at 07:15

## 2018-01-01 RX ADMIN — MORPHINE SULFATE 2 MG: 10 INJECTION, SOLUTION INTRAMUSCULAR; INTRAVENOUS at 07:32

## 2018-01-01 RX ADMIN — CLONAZEPAM 0.5 MG: 1 TABLET ORAL at 22:02

## 2018-01-01 RX ADMIN — Medication 10 ML: at 21:15

## 2018-01-01 RX ADMIN — DOBUTAMINE IN DEXTROSE 5 MCG/KG/MIN: 200 INJECTION, SOLUTION INTRAVENOUS at 11:14

## 2018-01-01 RX ADMIN — SODIUM CHLORIDE 0.2 MCG/KG/HR: 900 INJECTION, SOLUTION INTRAVENOUS at 10:28

## 2018-01-01 RX ADMIN — INSULIN LISPRO 3 UNITS: 100 INJECTION, SOLUTION INTRAVENOUS; SUBCUTANEOUS at 07:41

## 2018-01-01 RX ADMIN — SERTRALINE HYDROCHLORIDE 100 MG: 50 TABLET ORAL at 18:15

## 2018-01-01 RX ADMIN — IOPAMIDOL 75 ML: 755 INJECTION, SOLUTION INTRAVENOUS at 03:47

## 2018-01-01 RX ADMIN — Medication 10 ML: at 05:59

## 2018-01-01 RX ADMIN — ACETAMINOPHEN 650 MG: 325 TABLET ORAL at 19:12

## 2018-01-01 RX ADMIN — CYCLOBENZAPRINE HYDROCHLORIDE 10 MG: 10 TABLET, FILM COATED ORAL at 14:36

## 2018-01-01 RX ADMIN — Medication 10 ML: at 22:03

## 2018-01-01 RX ADMIN — DEXAMETHASONE SODIUM PHOSPHATE 10 MG: 10 INJECTION, SOLUTION INTRAMUSCULAR; INTRAVENOUS at 16:06

## 2018-01-01 RX ADMIN — SERTRALINE HYDROCHLORIDE 100 MG: 50 TABLET ORAL at 18:01

## 2018-01-01 RX ADMIN — LORAZEPAM 1 MG: 2 INJECTION INTRAMUSCULAR; INTRAVENOUS at 16:22

## 2018-01-01 RX ADMIN — Medication 10 ML: at 23:50

## 2018-01-01 RX ADMIN — OXYCODONE HYDROCHLORIDE 10 MG: 5 TABLET ORAL at 11:05

## 2018-01-01 RX ADMIN — CLONAZEPAM 0.5 MG: 1 TABLET ORAL at 13:40

## 2018-01-01 RX ADMIN — OXYCODONE HYDROCHLORIDE AND ACETAMINOPHEN 2 TABLET: 5; 325 TABLET ORAL at 15:30

## 2018-01-01 RX ADMIN — SERTRALINE HYDROCHLORIDE 100 MG: 50 TABLET ORAL at 19:12

## 2018-01-01 RX ADMIN — GRANISETRON HYDROCHLORIDE 1 MG: 1 INJECTION INTRAVENOUS at 16:10

## 2018-01-01 RX ADMIN — INSULIN LISPRO 3 UNITS: 100 INJECTION, SOLUTION INTRAVENOUS; SUBCUTANEOUS at 17:23

## 2018-01-01 RX ADMIN — CLONAZEPAM 0.5 MG: 1 TABLET ORAL at 23:31

## 2018-01-01 RX ADMIN — PIPERACILLIN SODIUM,TAZOBACTAM SODIUM 4.5 G: 4; .5 INJECTION, POWDER, FOR SOLUTION INTRAVENOUS at 11:15

## 2018-01-01 RX ADMIN — SODIUM CHLORIDE 25 ML/HR: 900 INJECTION, SOLUTION INTRAVENOUS at 08:05

## 2018-01-01 RX ADMIN — Medication 10 ML: at 03:47

## 2018-01-01 RX ADMIN — MORPHINE SULFATE 2 MG: 10 INJECTION, SOLUTION INTRAMUSCULAR; INTRAVENOUS at 10:10

## 2018-01-01 RX ADMIN — LORAZEPAM 0.5 MG: 2 INJECTION INTRAMUSCULAR at 09:37

## 2018-01-01 RX ADMIN — SERTRALINE HYDROCHLORIDE 100 MG: 50 TABLET ORAL at 10:08

## 2018-01-01 RX ADMIN — ACETAMINOPHEN 650 MG: 325 TABLET ORAL at 23:50

## 2018-01-01 RX ADMIN — ACETAMINOPHEN 650 MG: 325 TABLET ORAL at 18:14

## 2018-01-01 RX ADMIN — CARVEDILOL 3.12 MG: 3.12 TABLET, FILM COATED ORAL at 10:08

## 2018-01-01 RX ADMIN — INSULIN LISPRO 2 UNITS: 100 INJECTION, SOLUTION INTRAVENOUS; SUBCUTANEOUS at 07:14

## 2018-01-01 RX ADMIN — INSULIN LISPRO 3 UNITS: 100 INJECTION, SOLUTION INTRAVENOUS; SUBCUTANEOUS at 22:24

## 2018-01-01 RX ADMIN — CARVEDILOL 3.12 MG: 3.12 TABLET, FILM COATED ORAL at 08:48

## 2018-01-01 RX ADMIN — FUROSEMIDE 40 MG: 10 INJECTION, SOLUTION INTRAMUSCULAR; INTRAVENOUS at 18:56

## 2018-01-01 RX ADMIN — VANCOMYCIN HYDROCHLORIDE 1250 MG: 10 INJECTION, POWDER, LYOPHILIZED, FOR SOLUTION INTRAVENOUS at 12:31

## 2018-01-01 RX ADMIN — CARVEDILOL 3.12 MG: 3.12 TABLET, FILM COATED ORAL at 17:24

## 2018-01-01 RX ADMIN — AMLODIPINE BESYLATE 5 MG: 5 TABLET ORAL at 10:08

## 2018-01-01 RX ADMIN — SODIUM CHLORIDE 100 ML: 900 INJECTION, SOLUTION INTRAVENOUS at 03:47

## 2018-01-01 RX ADMIN — LORAZEPAM 1 MG: 2 INJECTION INTRAMUSCULAR; INTRAVENOUS at 11:52

## 2018-01-01 RX ADMIN — HYDROMORPHONE HYDROCHLORIDE 4 MG: 1 INJECTION, SOLUTION INTRAMUSCULAR; INTRAVENOUS; SUBCUTANEOUS at 16:20

## 2018-01-01 RX ADMIN — Medication 10 ML: at 05:37

## 2018-01-01 RX ADMIN — ASPIRIN 81 MG 162 MG: 81 TABLET ORAL at 02:36

## 2018-01-01 RX ADMIN — MORPHINE SULFATE 2 MG: 10 INJECTION, SOLUTION INTRAMUSCULAR; INTRAVENOUS at 08:47

## 2018-01-01 RX ADMIN — IPRATROPIUM BROMIDE AND ALBUTEROL SULFATE 3 ML: .5; 3 SOLUTION RESPIRATORY (INHALATION) at 17:40

## 2018-01-01 RX ADMIN — POTASSIUM CHLORIDE 10 MEQ: 10 INJECTION, SOLUTION INTRAVENOUS at 15:52

## 2018-01-01 RX ADMIN — Medication 10 ML: at 14:07

## 2018-01-01 RX ADMIN — INSULIN LISPRO 2 UNITS: 100 INJECTION, SOLUTION INTRAVENOUS; SUBCUTANEOUS at 12:07

## 2018-01-01 RX ADMIN — LIDOCAINE HYDROCHLORIDE 10 ML: 10 INJECTION, SOLUTION INFILTRATION; PERINEURAL at 09:04

## 2018-01-01 RX ADMIN — LOSARTAN POTASSIUM 100 MG: 50 TABLET, FILM COATED ORAL at 09:04

## 2018-01-01 RX ADMIN — ALBUMIN (HUMAN) 25 G: 0.25 INJECTION, SOLUTION INTRAVENOUS at 23:14

## 2018-01-01 RX ADMIN — ACETAMINOPHEN 650 MG: 325 TABLET ORAL at 11:05

## 2018-01-01 RX ADMIN — LEVOFLOXACIN 750 MG: 5 INJECTION, SOLUTION INTRAVENOUS at 16:29

## 2018-01-01 RX ADMIN — INSULIN LISPRO 2 UNITS: 100 INJECTION, SOLUTION INTRAVENOUS; SUBCUTANEOUS at 16:45

## 2018-01-01 RX ADMIN — OXYCODONE HYDROCHLORIDE 10 MG: 5 TABLET ORAL at 01:06

## 2018-01-01 RX ADMIN — Medication 10 ML: at 03:49

## 2018-01-01 RX ADMIN — PANTOPRAZOLE SODIUM 40 MG: 40 TABLET, DELAYED RELEASE ORAL at 09:03

## 2018-01-01 RX ADMIN — MORPHINE SULFATE 2 MG: 10 INJECTION, SOLUTION INTRAMUSCULAR; INTRAVENOUS at 03:47

## 2018-01-01 RX ADMIN — MORPHINE SULFATE 2 MG: 10 INJECTION, SOLUTION INTRAMUSCULAR; INTRAVENOUS at 11:52

## 2018-01-01 RX ADMIN — LORAZEPAM 0.5 MG: 2 INJECTION INTRAMUSCULAR; INTRAVENOUS at 09:37

## 2018-05-28 PROBLEM — C95.90 LEUKEMIA (HCC): Status: ACTIVE | Noted: 2018-01-01

## 2018-05-28 NOTE — ACP (ADVANCE CARE PLANNING)
Advance Care Planning (ACP) Provider Note - Comprehensive     Date of ACP Conversation: 05/28/18  Persons included in Conversation:  patient and family  Length of ACP Conversation in minutes:  16 minutes        General ACP for ALL Patients with Decision Making Capacity:   Importance of advance care planning, including choosing a healthcare agent to communicate patient's healthcare decisions if patient lost the ability to make decisions, such as after a sudden illness or accident  Exploration of values, goals, and preferences if recovery is not expected, even with continued medical treatment in the event of: Imminent death  Severe, permanent brain injury    Review of Existing Advance Directive:  What information were you given about medical decisions to consider before completing your advance directive? not sure    For Serious or Chronic Illness:  Understanding of medical condition    Understanding of CPR, goals and expected outcomes, benefits and burdens discussed. Information on CPR success rates provided (e.g. for CPR in hospital, survival to d/c at two weeks is 22%, for chronically ill or elderly/frail survival is less than 3%); Individual asked to communicate understanding of information in his/her own words. Explored fears and concerns regarding CPR or possible outcomes    Interventions Provided:  Recommended communicating the plan and making copies for the healthcare agent, personal physician, and others as appropriate (e.g., health system)  Recommended review of completed ACP document annually or upon change in health status  Other Treatment or Goals of Care Decisions: FULL CODE, however in event of no hope of recovery would not want to have ongoing artificial measures keeping her alive. Already has \"living will\" (e.g. continue/discontinue dialysis; elect/forgo surgery)     SUMMARY:  Discussed this issue at length with patient and multiple family members at bedside.   Patient initially said DNR, however family fairly upset with this and ultimately patient changed her mind to FULL CODE. She already has a living will at home, but unsure of contents. Asked her to bring so we can scan into records.       Signed By: Josephine Torres MD     May 28, 2018

## 2018-05-28 NOTE — IP AVS SNAPSHOT
[address] 
[phone] Patient: Eldon Lacy MRN: [id number]:[id number] You are allergic to the following Allergen Reactions Codeine Nausea Only Sulfa (Sulfonamide Antibiotics) Nausea Only Recent Documentation Height Weight Breastfeeding? BMI OB Status Smoking Status 1.753 m 79.2 kg No 25.78 kg/m2 Postmenopausal Never Smoker Unresulted Labs-Please follow up with your PCP about these lab tests Order Current Status FIBRINOGEN In process PROTHROMBIN TIME + INR In process PTT In process Emergency Contacts  (Rel.) Home Phone Work Phone Mobile Phone [phone] -- [phone] Justus Luna (Daughter) [phone] -- [phone] About your hospitalization You were admitted on:  May 28, 2018 You last received care in the:  [facility] You were discharged on:  May 31, 2018 Why you were hospitalized Your primary diagnosis was:  Aml (Acute Myeloid Leukemia) (Hcc) Your diagnoses also included:  Leukemia (Hcc) Providers Seen During Your Hospitalization Provider Specialty Primary office phone Michael Allen MD Emergency Medicine [phone] Leighann Herron MD Hospitalist [phone] Shahida Tena MD Internal Medicine [phone] Your Primary Care Physician (PCP) Primary Care Physician Office Phone Office Fax Marti Funk [phone] Follow-up Information None My Medications ASK your physician about these medications Instructions Each Dose to Equal  
 Morning Noon Evening Bedtime  
 amLODIPine 5 mg tablet Commonly known as:  Job Dub Your last dose was: Your next dose is: Take 5 mg by mouth daily. 5 mg  
    
   
   
   
  
 aspirin 81 mg tablet Your last dose was: Your next dose is: Take 81 mg by mouth daily. 81 mg  
    
   
   
   
  
 benzonatate 200 mg capsule Commonly known as:  TESSALON Your last dose was: Your next dose is: Take 200 mg by mouth three (3) times daily as needed for Cough. 200 mg  
    
   
   
   
  
 cefdinir 300 mg capsule Commonly known as:  OMNICEF Your last dose was: Your next dose is:    
   
   
 take 1 capsule by mouth every 12 hours * clonazePAM 0.5 mg tablet Commonly known as:  Malcolm Hui Your last dose was: Your next dose is: Take 0.5 mg by mouth nightly as needed. 0.5 mg  
    
   
   
   
  
 * clonazePAM 0.5 mg tablet Commonly known as:  Malcolm Hui Your last dose was: Your next dose is: Take  by mouth. COREG 3.125 mg tablet Generic drug:  carvedilol Your last dose was: Your next dose is: Take 3.125 mg by mouth two (2) times daily (with meals). 3.125 mg  
    
   
   
   
  
 cyclobenzaprine 10 mg tablet Commonly known as:  FLEXERIL Your last dose was: Your next dose is: Take 10 mg by mouth three (3) times daily as needed for Muscle Spasm(s). 10 mg  
    
   
   
   
  
 EFFIENT 10 mg tablet Generic drug:  prasugrel Your last dose was: Your next dose is: Take 10 mg by mouth. 10 mg  
    
   
   
   
  
 ezetimibe 10 mg tablet Commonly known as:  Brown Crumble Your last dose was: Your next dose is:    
   
   
      
   
   
   
  
 fenofibrate 160 mg tablet Commonly known as:  LOFIBRA Your last dose was: Your next dose is: Take 160 mg by mouth daily. 160 mg  
    
   
   
   
  
 hydrOXYzine HCl 25 mg tablet Commonly known as:  ATARAX Your last dose was: Your next dose is: 25 mg three (3) times daily as needed. 25 mg  
    
   
   
   
  
 LOMOTIL 2.5-0.025 mg per tablet Generic drug:  diphenoxylate-atropine Your last dose was: Your next dose is: Take  by mouth.  
     
   
   
   
  
 losartan 100 mg tablet Commonly known as:  COZAAR Your last dose was: Your next dose is:    
   
   
 100 mg daily. 100 mg  
    
   
   
   
  
 metFORMIN  mg tablet Commonly known as:  GLUCOPHAGE XR Your last dose was: Your next dose is:    
   
   
 1,000 mg daily (with dinner). 1000 mg  
    
   
   
   
  
 methylPREDNISolone 4 mg tablet Commonly known as:  Gene Sales Your last dose was: Your next dose is: Take  by mouth Specific Days and Specific Times. nitrofurantoin 50 mg capsule Commonly known as:  MACRODANTIN Your last dose was: Your next dose is:    
   
   
      
   
   
   
  
 omeprazole 20 mg capsule Commonly known as:  PRILOSEC Your last dose was: Your next dose is: Take 20 mg by mouth daily. 20 mg  
    
   
   
   
  
 ondansetron hcl 4 mg tablet Commonly known as:  Mirna Kurk Your last dose was: Your next dose is:    
   
   
      
   
   
   
  
 sertraline 100 mg tablet Commonly known as:  ZOLOFT Your last dose was: Your next dose is:    
   
   
 take 1 tablet by mouth twice a day * Notice: This list has 2 medication(s) that are the same as other medications prescribed for you. Read the directions carefully, and ask your doctor or other care provider to review them with you. Discharge Instructions None Discharge Orders None Introducing [facility]! Kym Angela introduces [employer] patient portal. Now you can access parts of your medical record, email your doctor's office, and request medication refills online. 1. In your internet browser, go to [email or web address] 2. Click on the First Time User? Click Here link in the Sign In box. You will see the New Member Sign Up page. 3. Enter your [employer] Access Code exactly as it appears below. You will not need to use this code after youve completed the sign-up process. If you do not sign up before the expiration date, you must request a new code. · [employer] Access Code: [email or web address] Expires: 8/26/2018  1:32 AM 
 
4. Enter the last four digits of your Social Security Number (xxxx) and Date of Birth (mm/dd/yyyy) as indicated and click Submit. You will be taken to the next sign-up page. 5. Create a [employer] ID. This will be your [employer] login ID and cannot be changed, so think of one that is secure and easy to remember. 6. Create a [employer] password. You can change your password at any time. 7. Enter your Password Reset Question and Answer. This can be used at a later time if you forget your password. 8. Enter your e-mail address. You will receive e-mail notification when new information is available in [address] 9. Click Sign Up. You can now view and download portions of your medical record. 10. Click the Download Summary menu link to download a portable copy of your medical information. If you have questions, please visit the Frequently Asked Questions section of the [employer] website. Remember, [employer] is NOT to be used for urgent needs. For medical emergencies, dial 911. Now available from your iPhone and Android! General Information Please provide this summary of care documentation to your next provider. Patient Signature:  ____________________________________________________________ Date:  ____________________________________________________________  
  
Gwendloyn Finger Provider Signature:  ____________________________________________________________ Date:  ____________________________________________________________

## 2018-05-28 NOTE — ED PROVIDER NOTES
HPI Comments: 68 y.o. female with past medical history significant for CAD, HTN, DM, basal cell CA, anxiety, kidney stones, elevated cholesterol, CVA, CKD, cardiac stents, cholecystectomy who presents accompanied by her  with chief complaint of L rib pain. The pt c/o L rib pain under her L breast that has been constant for about 3.5 days. The pt notes that moving and touching her lateral L ribs significantly increases her CP, and she can also have random pain without provocation. She also indicates that taking a deep breath is painful. The pt reports that she saw her PCP who ordered an xray and told her that she likely has an intercostal strain, and she admits that she may have strained something moving luggage the other weekend. The pt notes that she has tried lidocaine patches and a muscle relaxer without relief. The pt reports that tonight her pain changed and she developed a heaviness in her upper abdomen that reminded her of her previous MI. She also developed L arm weakness and tingling tonight that have already resolved. Records indicate that she is on Effient. The pt also reports a dry cough and she notes a recent diagnosis of bronchitis. Denies bruising, back pain, fever, hx of DVT, hx of PE, recent surgery, and recent immobilization. There are no other acute medical concerns at this time. Social hx: nonsmoker  PCP: Darylene Catholic, MD  Cardiology: Dr. Adilia Sierra    Note written by Bhargav Cortes, as dictated by Clare Jurado MD 2:05 AM     The history is provided by the patient. No  was used.         Past Medical History:   Diagnosis Date    CAD (coronary artery disease)     Cancer ([facility] [address]     basal cell cancer face arm and chest    Chronic kidney disease     kidney stones    Diabetes ([facility] [address]     Hypertension     Other ill-defined conditions(799.89)     hx of kidney stones    Other ill-defined conditions(799.89)     elevated cholesterol    Psychiatric disorder anxiety    [facility] [address]     Unspecified adverse effect of anesthesia     ? novacaine rapid heart beat       Past Surgical History:   Procedure Laterality Date    ABDOMEN SURGERY PROC UNLISTED      COLONOSCOPY N/A 9/15/2016    COL[facility] performed [facility]COPY    COLONOSCOPY,DIAGNOSTIC  9/15/2016         HX CHOLECYSTECTOMY      HX HEART CATHETERIZATION      x2 cardiac stents    HX HEENT      x4 sinus surgeries    HX HEENT      cataracts and implants    HX ORTHOPAEDIC      broke back - \"cement\" in back    HX OTHER SURGICAL      skin cancer removed face arms neck    HX OTHER SURGICAL      hemorrhoidectomy and fissure repair    HX OTHER SURGICAL      colonoscopy    HX OTHER SURGICAL      gall bladder removed    HX TONSILLECTOMY      SD COLONOSCOPY FLX DX W/COLLJ SPEC WHEN PFRMD  2/26/2013              Family History:   Problem Relation Age of Onset    Cancer Father      colon cancer    Cancer Mother      lymphoma       Social History     Social History    Marital status:      Spouse name: N/A    Number of children: N/A    Years of education: N/A     Occupational History    Not on file. Social History Main Topics    Smoking status: Never Smoker    Smokeless tobacco: Never Used    Alcohol use No    Drug use: No    Sexual activity: Not on file     Other Topics Concern    Not on file     Social History Narrative         ALLERGIES: Codeine and Sulfa (sulfonamide antibiotics)    Review of Systems   Constitutional: Negative for chills and fever. HENT: Negative for congestion, nosebleeds and rhinorrhea. Eyes: Negative for pain and redness. Respiratory: Positive for cough. Negative for shortness of breath. Cardiovascular: Negative for chest pain and palpitations. Gastrointestinal: Positive for abdominal pain (abdominal heaviness). Negative for nausea and vomiting. Genitourinary: Negative for dysuria, frequency, vaginal bleeding and vaginal pain. Musculoskeletal: Positive for myalgias (L rib pain). Negative for back pain. Skin: Negative for rash and wound. Neurological: Positive for weakness (L arm) and numbness (L arm tingling). Negative for seizures and syncope. Hematological: Does not bruise/bleed easily. Psychiatric/Behavioral: Negative for agitation, confusion, dysphoric mood and suicidal ideas. The patient is not nervous/anxious. Vitals:    05/28/18 0137   BP: 135/68   Pulse: 74   Resp: 19   Temp: 98.1 °F (36.7 °C)   SpO2: 90%            Physical Exam   Constitutional: She is oriented to person, place, and time. She appears well-developed and well-nourished. HENT:   Head: Normocephalic and atraumatic. Eyes: EOM are normal. Pupils are equal, round, and reactive to light. Neck: Normal range of motion. Neck supple. No tracheal deviation present. Cardiovascular: Normal rate, regular rhythm, normal heart sounds and intact distal pulses. Pulmonary/Chest: Effort normal and breath sounds normal. No stridor. No respiratory distress. She has no wheezes. She has no rales. She exhibits tenderness (lateral L chest wall tenderness). She exhibits no crepitus. No stepoff. Abdominal: Soft. Bowel sounds are normal. She exhibits no distension. There is no tenderness. There is no rebound. Musculoskeletal: Normal range of motion. She exhibits no edema or tenderness. Neurological: She is alert and oriented to person, place, and time. No cranial nerve deficit. Skin: Skin is warm and dry. No rash noted. No pallor. Psychiatric: She has a normal mood and affect. Nursing note and vitals reviewed. Note written by Bhargav Augustin, as dictated by Mercy Burnette MD 2:05 AM     MDM  Number of Diagnoses or Management Options  Diagnosis management comments: 69yF with PMH sig4 CAD, HTN, DM, basal cell cancer p/w c/o 3.5 days left sided chest rib pain worse with movement and palpation. PMD dx'd MSK pain.  Pt is well appearing, nad, hd stable, no resp distress, left chest wall ttp with reproducible cp. Plan-pain control, ekg, monitor, cxr, cbc/cmp/ce/ddimer. cxr unremarkable  ddimer elevated-CTA chest ordered    CTA chest no acute abnl  CBC reviewed manually by pathologist and called to report abnl diff with blasts concerning for acute leukemia       Amount and/or Complexity of Data Reviewed  Clinical lab tests: ordered and reviewed  Tests in the radiology section of CPT®: ordered and reviewed  Discuss the patient with other providers: yes  Independent visualization of images, tracings, or specimens: yes    Critical Care  Total time providing critical care:  minutes (75 min)    Patient Progress  Patient progress: improved        ED Course       Procedures                 ED EKG interpretation:  Rhythm: normal sinus rhythm; and regular . Rate (approx.): 74 bpm; Axis: left axis deviation; ST/T wave: normal; Normal [place]. Normal QRS. No ischemia. Unchanged from 2/22/2014. Note written by Bhargav Augustin, as dictated by Mercy Burnette MD 2:00 AM     5:05 AM  Mercy Burnette MD spoke with Dr. Yobani Drummond for pathology. Discussed available diagnostic tests and clinical findings. He/She is in agreement with care plans as outlined. He/she suspects acute leukemia vs myelodysplasia. He advised he would like to discuss directly with hematology and come up with plan. 7am  Discussed results with patient and family at length. Patient and family express understanding and taking news remarkably well. Agreeable with plan for hematology consultation. 8:07 AM  Signed out to Dr. Patricia Nobles pending hematology consultation.

## 2018-05-28 NOTE — PROGRESS NOTES
Admission Medication Reconciliation:    Information obtained from:  Patient, chart review, insurance claim history    Comments/Recommendations: All medications/allergies have been reviewed and updated; last medication administration times reviewed and recorded. The patient could recognize names of medications, but could not recall doses. Insurance claim history was used to confirm doses and frequencies of medications. Changes made to Prior to Admission (PTA) Medication List:   ?   Medications Added:   - amlodipine, Tessalon, carvedilol, cyclobenzaprine, fenofibrate, Medrol dose pack  ? Medications Changed:   - Updated dose of losartan from 50 mg to 100 mg daily  - updated dose of metformin to 1000 mg with dinner  - updated dose of sertraline to 100 mg daily  ? Medications Removed:   - HCTZ 25 mg daily  - metoprolol 25 mg BID  - crestor 20 mg daily       Significant PMH/Disease States:   Past Medical History:   Diagnosis Date    CAD (coronary artery disease)     Cancer ([facility] [address]     basal cell cancer face arm and chest    Chronic kidney disease     kidney stones    Diabetes ([facility] [address]     Hypertension     Other ill-defined conditions(799.89)     hx of kidney stones    Other ill-defined conditions(799.89)     elevated cholesterol    Psychiatric disorder     anxiety    Stroke ([facility] [address]     Unspecified adverse effect of anesthesia     ? novacaine rapid heart beat       Chief Complaint for this Admission:    Chief Complaint   Patient presents with    Chest Pain       Allergies:  Codeine and Sulfa (sulfonamide antibiotics)    Prior to Admission Medications:   Prior to Admission Medications   Prescriptions Last Dose Informant Patient Reported? Taking? amLODIPine (NORVASC) 5 mg tablet   Yes Yes   Sig: Take 5 mg by mouth daily. aspirin 81 mg tablet   Yes No   Sig: Take 81 mg by mouth daily. benzonatate (TESSALON) 200 mg capsule   Yes Yes   Sig: Take 200 mg by mouth three (3) times daily as needed for Cough. carvedilol (COREG) 3.125 mg tablet   Yes Yes   Sig: Take 3.125 mg by mouth two (2) times daily (with meals). clonazePAM (KLONOPIN) 0.5 mg tablet   Yes No   Sig: Take 0.5 mg by mouth nightly as needed. cyclobenzaprine (FLEXERIL) 10 mg tablet   Yes Yes   Sig: Take 10 mg by mouth three (3) times daily as needed for Muscle Spasm(s). fenofibrate (LOFIBRA) 160 mg tablet   Yes Yes   Sig: Take 160 mg by mouth daily. hydrOXYzine (ATARAX) 25 mg tablet   Yes No   Si mg three (3) times daily as needed. losartan (COZAAR) 100 mg tablet   Yes No   Si mg daily. metFORMIN ER (GLUCOPHAGE XR) 500 mg tablet   Yes No   Si,000 mg daily (with dinner). methylPREDNISolone (MEDROL DOSEPACK) 4 mg tablet   Yes Yes   Sig: Take  by mouth Specific Days and Specific Times. omeprazole (PRILOSEC) 20 mg capsule   Yes No   Sig: Take 20 mg by mouth daily. ondansetron hcl (ZOFRAN) 4 mg tablet   Yes No   prasugrel (EFFIENT) 10 mg tablet   Yes No   Sig: Take 10 mg by mouth. sertraline (ZOLOFT) 50 mg tablet   Yes No   Sig: Take 100 mg by mouth daily. Facility-Administered Medications: None     Thank you for allowing pharmacy to participate in the coordination of this patient's care. If you have any other questions, please contact the medication reconciliation pharmacist at [phone]. Krupa Merrill PharmShabbir D.

## 2018-05-28 NOTE — H&P
Hospitalist Admission H&P     Patient Name: Dean Walker  MRN: [id number]  Date of Admission: 5/28/2018    PCP: Raegan Martinez MD    Subjective     CC: left side rib pain      HPI:   Dean Walker is a 68 y.o. female with PMH including CAD s/p multiple stents, HTN, HLD, NIDDM2, CKD, anxiety, GERD; presenting to the ED with complaint of sharp left side chest pains. Patient reports 2-3 week history of generalized joint aches and pains, specifically knees and right clavicle. Was told by PCP it was probably arthritis. Also reporting problems with intermittent night sweats over the last several weeks, bad enough she is soaking through the sheets. The reason she came to the ED today was for 3-4 day history intermittent sharp left side chest wall pains. Described as stabbing, 10/10 intensity, worse with deep inspiration / movement / or palpation, non-exertional. Feels like she can't breathe as deep as usual because of the pain. Admits to doing some spring cleaning recently -- moving luggage around a few weeks ago and also moving lots of heavy winter clothes [facility] last week. Specifically says she was carrying piles of clothing over her left arm and it was very heavy. Denies any trauma or falls. Denies any history of fevers, chills, dizziness, syncope/near-syncope, unintentional weight loss, abdominal pain, N/V/D, bleeding gums, blood in stools, swelling, or other issues. In the ED -- VSS. Not hypoxic, but placed on 2L NC for comfort. D-dimer was elevated, so CTA chest was obtained and found negative for PE or other acute process. Rib XR negative for fracture. Troponin x2 negative and EKG w/ NSR, nonischemic. CBC incidentally found to be abnormal with evidence of \"an emerging acute leukemia\" per path report. Noted blast cells, mild neutropenia (WBC 5.9, ANC 1.1K), normocytic anemia (Hg 10.5), and thrombocytopenia (plt 58).  Oncology has already been consulted and planning bone marrow biopsy tomorrow. Review of Systems [facility] = positive, otherwise negative)     A comprehensive review of systems was negative except for that written in the History of Present Illness. Past Medical History      Past Medical History:   Diagnosis Date    CAD (coronary artery disease)     Cancer [facility] [facility]     basal cell cancer face arm and chest    Chronic kidney disease     kidney stones    Diabetes ([facility] [facility]     Hypertension     Other ill-defined conditions(799.89)     hx of kidney stones    Other ill-defined conditions(799.89)     elevated cholesterol    Psychiatric disorder     anxiety    Stroke ([facility] [facility]     Unspecified adverse effect of anesthesia     ? novacaine rapid heart beat       Past Surgical History     Past Surgical History:   Procedure Laterality Date    ABDOMEN SURGERY PROC UNLISTED      COLONOSCOPY N/A 9/15/2016    COLONOS[facility] performed by [facility]lyn [facility]ung [facility] at [facility]  9/15/2016         HX CHOLECYSTECTOMY      HX HEART CATHETERIZATION      x2 cardiac stents    HX HEENT      x4 sinus surgeries    HX HEENT      cataracts and implants    HX ORTHOPAEDIC      broke back - \"cement\" in back    HX OTHER SURGICAL      skin cancer removed face arms neck    HX OTHER SURGICAL      hemorrhoidectomy and fissure repair    HX OTHER SURGICAL      colonoscopy    HX OTHER SURGICAL      gall bladder removed    HX TONSILLECTOMY      [place] COLONOSCOPY FLX DX W/COLLJ SPEC WHEN PFRMD  2/26/2013             Family Medical History     Family History   Problem Relation Age of Onset    Cancer Father      colon cancer    Cancer Mother      lymphoma       Social History     Social History   Substance Use Topics    Smoking status: Never Smoker    Smokeless tobacco: Never Used    Alcohol use No       Medications and Allergies   Medications reviewed and confirmed by patient, they are as follows:    Medications:   Prior to Admission medications    Medication Sig Start Date End Date Taking? Authorizing Provider   amLODIPine (NORVASC) 5 mg tablet Take 5 mg by mouth daily. Yes Historical Provider   benzonatate (TESSALON) 200 mg capsule Take 200 mg by mouth three (3) times daily as needed for Cough. Yes Historical Provider   carvedilol (COREG) 3.125 mg tablet Take 3.125 mg by mouth two (2) times daily (with meals). Yes Historical Provider   cyclobenzaprine (FLEXERIL) 10 mg tablet Take 10 mg by mouth three (3) times daily as needed for Muscle Spasm(s). Yes Historical Provider   fenofibrate (LOFIBRA) 160 mg tablet Take 160 mg by mouth daily. Yes Historical Provider   methylPREDNISolone (MEDROL DOSEPACK) 4 mg tablet Take  by mouth Specific Days and Specific Times. Yes Historical Provider   prasugrel (EFFIENT) 10 mg tablet Take 10 mg by mouth. Historical Provider   hydrOXYzine (ATARAX) 25 mg tablet 25 mg three (3) times daily as needed. 9/11/13   Historical Provider   losartan (COZAAR) 100 mg tablet 100 mg daily. 11/4/13   Historical Provider   metFORMIN ER (GLUCOPHAGE XR) 500 mg tablet 1,000 mg daily (with dinner). 11/13/13   Historical Provider   ondansetron hcl (ZOFRAN) 4 mg tablet  11/13/13   Historical Provider   aspirin 81 mg tablet Take 81 mg by mouth daily. Historical Provider   omeprazole (PRILOSEC) 20 mg capsule Take 20 mg by mouth daily. Historical Provider   sertraline (ZOLOFT) 50 mg tablet Take 100 mg by mouth daily. Historical Provider   clonazePAM (KLONOPIN) 0.5 mg tablet Take 0.5 mg by mouth nightly as needed. Historical Provider       Allergies: Allergies   Allergen Reactions    Codeine Nausea Only    Sulfa (Sulfonamide Antibiotics) Nausea Only       Objective Findings     Physical Examination (at least 8/12 bulleted systems)  Vitals: /88  Pulse 75  Temp 98.4 °F (36.9 °C)  Resp 19  SpO2 92%  Constitutional: vital signs noted.  Appears anxious, uncomfortable, holding ice packs to left chest.  Eyes: pupils equal and reactive, extraocular eye movements intact. Ears, nose, throat, mouth: ENT exam normal, no neck nodes or sinus tenderness. Cardiovascular: normal rate, regular rhythm, normal S1, S2, no murmurs, rubs, clicks or gallops. Respiratory: clear to auscultation, currently on 2L NC, no wheezes, rales or rhonchi, symmetric air entry. Chest: Patient very tender to palpation along left chest wall/breast area into lateral left chest wall. Clearing splinting and pain worsening with deep inspiration or movements. Gastrointestinal: soft, nontender, nondistended, bowel sounds positive,  no masses or organomegaly. Musculoskeletal: no joint tenderness, deformity or swelling. Skin: normal coloration and turgor, no rashes, no suspicious skin lesions noted. Extremities: no edema  Neurological: alert, oriented, normal speech, no focal findings or movement disorder noted. Psychological: very anxious  Hematologic and lymphatic: some various bruising noted b/l UE     Labs and Imaging      Recent Results (from the past 24 hour(s))   EKG, 12 LEAD, INITIAL    Collection Time: 05/28/18  1:35 AM   Result Value Ref Range    Ventricular Rate 74 BPM    Atrial Rate 74 BPM    P-R Interval 146 ms    QRS Duration 80 ms    Q-T Interval 388 ms    QTC Calculation (Bezet) 430 ms    Calculated P Axis 43 degrees    Calculated R Axis -10 degrees    Calculated T Axis 18 degrees    Diagnosis       Normal sinus rhythm  When compared with ECG of 22-FEB-2014 14:59,  Vent.  rate has increased BY  27 BPM     CBC WITH AUTOMATED DIFF    Collection Time: 05/28/18  1:53 AM   Result Value Ref Range    WBC 5.8 3.6 - 11.0 K/uL    RBC 3.59 (L) 3.80 - 5.20 M/uL    HGB 10.5 (L) 11.5 - 16.0 g/dL    HCT 32.4 (L) 35.0 - 47.0 %    MCV 90.3 80.0 - 99.0 FL    MCH 29.2 26.0 - 34.0 PG    MCHC 32.4 30.0 - 36.5 g/dL    RDW 17.1 (H) 11.5 - 14.5 %    PLATELET 58 (L) 902 - 400 K/uL    MPV 10.5 8.9 - 12.9 FL    NRBC 1.0 (H) 0  WBC    ABSOLUTE NRBC 0.06 (H) 0.00 - 0.01 K/uL    NEUTROPHILS 15 (L) 32 - 75 % BAND NEUTROPHILS 4 0 - 6 %    LYMPHOCYTES 24 12 - 49 %    MONOCYTES 47 (H) 5 - 13 %    EOSINOPHILS 0 0 - 7 %    BASOPHILS 0 0 - 1 %    BLASTS 10 (H) 0 %    IMMATURE GRANULOCYTES 0 %    ABS. NEUTROPHILS 1.1 (L) 1.8 - 8.0 K/UL    ABS. LYMPHOCYTES 1.4 0.8 - 3.5 K/UL    ABS. MONOCYTES 2.7 (H) 0.0 - 1.0 K/UL    ABS. EOSINOPHILS 0.0 0.0 - 0.4 K/UL    ABS. BASOPHILS 0.0 0.0 - 0.1 K/UL    ABS. IMM. GRANS. 0.0 K/UL    DF MANUAL      RBC COMMENTS OVALOCYTES  PRESENT        RBC COMMENTS TEARDROP CELLS  PRESENT        RBC COMMENTS POLYCHROMASIA  1+        RBC COMMENTS ANISOCYTOSIS  1+        WBC COMMENTS Pathology Review Requested      Pathologist review       An emerging acute leukemia. Recommend bone marrow examination. Thrombcytopenia and normocytic normochromic anemia consistent with above. The case is discussed with Dr Moraima Soares in the ED at 06:35 on 5/28/2018. CK W/ CKMB & INDEX    Collection Time: 05/28/18  1:53 AM   Result Value Ref Range    CK 21 (L) 26 - 192 U/L    CK - MB <1.0 <3.6 NG/ML    CK-MB Index Cannot be calculated 0 - 2.5     METABOLIC PANEL, COMPREHENSIVE    Collection Time: 05/28/18  1:53 AM   Result Value Ref Range    Sodium 140 136 - 145 mmol/L    Potassium 4.1 3.5 - 5.1 mmol/L    Chloride 104 97 - 108 mmol/L    CO2 25 21 - 32 mmol/L    Anion gap 11 5 - 15 mmol/L    Glucose 195 (H) 65 - 100 mg/dL    BUN 21 (H) 6 - 20 MG/DL    Creatinine 0.70 0.55 - 1.02 MG/DL    BUN/Creatinine ratio 30 (H) 12 - 20      GFR est AA >60 >60 ml/min/1.73m2    GFR est non-AA >60 >60 ml/min/1.73m2    Calcium 8.8 8.5 - 10.1 MG/DL    Bilirubin, total 0.4 0.2 - 1.0 MG/DL    ALT (SGPT) 40 12 - 78 U/L    AST (SGOT) 33 15 - 37 U/L    Alk.  phosphatase 132 (H) 45 - 117 U/L    Protein, total 6.9 6.4 - 8.2 g/dL    Albumin 3.6 3.5 - 5.0 g/dL    Globulin 3.3 2.0 - 4.0 g/dL    A-G Ratio 1.1 1.1 - 2.2     D DIMER    Collection Time: 05/28/18  1:53 AM   Result Value Ref Range    D-dimer 5.23 (H) 0.00 - 0.65 mg/L FEU   TROPONIN I Collection Time: 05/28/18  1:53 AM   Result Value Ref Range    Troponin-I, Qt. <0.04 <0.05 ng/mL       EKG:  normal EKG, normal sinus rhythm , no ischemic changes    Assessment and Plan     #Suspected acute leukemia   Abnormal CBC w/ blasts, thrombocytopenia, normocytic anemia  Night sweats, generalized weakness/fatigue likely due to above  -heme/onc consulted, suspicion for acute leukemia or advanced MDS  -bone marrow biopsy tomorrow for definitive diagnosis  -iron studies / B12 / folate / peripheral smear ordered    #Acute costochondritis  Uncontrolled pain  -patient's pain clearly appears to be musculoskeletal (non-cardiac). Inciting factors include recent heavy lifting with her left arm. Pain is reproducible to palpation, worse with deep inspiration or movement.   -Troponins negative and EKG nonischemic. No plans for further additional cardiac workup.   -scheduled tylenol, oxycodone prn, trial lidoderm patch, trial flexeril; IV mophine prn as last option  -heating pad prn  -IS    #Other chronic issues: CAD s/p multiple stents, HTN, HLD, NIDDM2, CKD, anxiety, GERD  -continue home medications as appropriate, see orders  -accuchecks ACHS, SSI      Safety:  Code Status: FULL  DVT prophylaxis: SCDs // avoiding chemoppx due to thrombocytopenia  Diet/Nutrition: diabetic  Disposition: home when ready       Malinda Roberto MD 5/28/2018 11:14 AM

## 2018-05-28 NOTE — ED NOTES
Change of shift. Care of patient taken over from Dr. Re Machuca; H&P reviewed, bedside handoff complete. Awaiting return call from Dr. Luis E Iqbal or hematology. Note written by Bhargav Haas, as dictated by Yolanda Marquez MD 8:11 AM    CONSULT NOTE:  9:54 AM Yolanda Marquez MD spoke with Dr. Arcadio Bell , Consult for Pathology. Discussed available diagnostic tests and clinical findings. Dr. Arcadio Bell spoke to oncology at [facility] side who was supposed to call oncology covering for [facility], but they did not. He is now trying to call on-call oncology @ [facility] covering for [facility]. He does not believe Pt qualifies for acute leukemia. CONSULT NOTE:  10:07 AM Yolanda Marquez MD spoke with Dr. Arcadio Bell, Consult for Pathology. Dr. Arcadio Bell spoke w/ on-call oncology for [facility]. They recommend admission for bone marrow biopsy tomorrow. CONSULT NOTE:  11:16 AM Yolanda Marquez MD, Consult for Hospitalist.  Discussed available diagnostic tests and clinical findings. Hospitalist will evaluate Pt for admission.

## 2018-05-28 NOTE — ED NOTES
Received a call from lab that blasts were found in the CBC, and she is awaiting a call from the pathologist to release the results.

## 2018-05-28 NOTE — ED TRIAGE NOTES
Triage: Patient arrives ambulatory from home with c/o chest pain increasing since Friday radiating into abdomen, patient has hx 4 stents and states this pain feels the same. Cardiologist Dr. Keenan Gilmore. Patient appears very uncomfortable and restless upon triage.

## 2018-05-28 NOTE — ED NOTES
TRANSFER - OUT REPORT:    Verbal report given to Kellee (name) on Merna Argueta  being transferred to 4(unit) for routine progression of care       Report consisted of patients Situation, Background, Assessment and   Recommendations(SBAR). Information from the following report(s) SBAR, ED Summary, [facility] and Recent Results was reviewed with the receiving nurse. Lines:   Peripheral IV 05/28/18 Left Antecubital (Active)        Opportunity for questions and clarification was provided.       Patient transported with:   [employer]

## 2018-05-28 NOTE — ED NOTES
Bedside received from [address]. On transfer on care, pt alert and oriented, respiration even and nonlabored.

## 2018-05-28 NOTE — CONSULTS
Consult Date: 5/28/2018    IP CONSULT TO ONCOLOGY  Consult performed by: Prerna Brito  Consult ordered by: Geno Nogueira     She was admitted with chest pain. CBC reviewed. Some blasts noted on smear. She reports 5-6 month history of progressive fatigue. In last month, she has had URI requiring multiple ABX courses. She also reports imbalance issues in the last month. Chest pain/ rib pain is what prompted her evaluation in ER yesterday. No trauma. Denies any significant weight loss. Denies any bleeding (no gum bleeding). Denies any enlarged LN. Past Medical History:  No date: CAD (coronary artery disease)  No date: Cancer ([facility] [address]     Comment: basal cell cancer face arm and chest  No date: Chronic kidney disease     Comment: kidney stones  No date: Diabetes ([facility] [address]  No date: Hypertension  No date: Other ill-defined conditions(799.89)     Comment: hx of kidney stones  No date:  Other ill-defined conditions(799.89)     Comment: elevated cholesterol  No date: Psychiatric disorder     Comment: anxiety  No date: Stroke ([facility] [address]  No date: Unspecified adverse effect of anesthesia     Comment: ? novacaine rapid heart beat   Past Surgical History:  No date: ABDOMEN SURGERY PROC UNLISTED  9/15/2016: COLONOSCOPY N/A     Comment: COLONOSCOPY performed by Waldemar Martin MD at \[facility]\""              ENDOSCOPY  9/15/2016: COLONOSCOPY,DIAGNOSTIC     Comment:    No date: HX CHOLECYSTECTOMY  No date: HX HEART CATHETERIZATION     Comment: x2 cardiac stents  No date: HX HEENT     Comment: x4 sinus surgeries  No date: HX HEENT     Comment: cataracts and implants  No date: HX ORTHOPAEDIC     Comment: broke back - \"cement\" in back  No date: HX OTHER SURGICAL     Comment: skin cancer removed face arms neck  No date: HX OTHER SURGICAL     Comment: hemorrhoidectomy and fissure repair  No date: HX OTHER SURGICAL     Comment: colonoscopy  No date: HX OTHER SURGICAL     Comment: gall bladder removed  No date: HX TONSILLECTOMY  2/26/2013: [place] COLONOSCOPY FLX DX W/COLLJ SPEC WHEN PFRMD     Comment:      Family History    Cancer Father     Comment: colon cancer    Cancer Mother     Comment: lymphoma         Smoking status: Never Smoker    Smokeless tobacco: Never Used    Alcohol use No       Current Facility-Administered Medications:  [START ON 5/29/2018] amLODIPine (NORVASC) tablet 5 mg 5 mg Oral DAILY Perry Ramirez MD    [START ON 5/29/2018] aspirin delayed-release tablet 81 mg 81 mg Oral DAILY Perry Ramirez MD    carvedilol (COREG) tablet 3.125 mg 3.125 mg Oral BID WITH MEALS Perry Ramirez MD    cyclobenzaprine (FLEXERIL) tablet 10 mg 10 mg Oral TID PRN Perry Ramirez MD    [START ON 5/29/2018] losartan (COZAAR) tablet 100 mg 100 mg Oral DAILY Perry Ramirez MD    [START ON 5/29/2018] pantoprazole (PROTONIX) tablet 40 mg 40 mg Oral DAILY Perry Ramirez MD    sertraline (ZOLOFT) tablet 100 mg 100 mg Oral BID Perry Ramirez MD    sodium chloride (NS) flush 5-10 mL 5-10 mL IntraVENous Q8H Perry Ramirez MD    sodium chloride (NS) flush 5-10 mL 5-10 mL IntraVENous PRN Perry Ramirez MD    acetaminophen (TYLENOL) tablet 650 mg 650 mg Oral Q4H PRN Perry Ramirez MD    oxyCODONE-acetaminophen (PERCOCET) 5-325 mg per tablet 1 Tab 1 Tab Oral Q4H PRN Perry Ramirez MD    morphine injection 2 mg 2 mg IntraVENous Q4H PRN Perry Ramirez MD 2 mg at 05/28/18 1318   naloxone (NARCAN) injection 0.4 mg 0.4 mg IntraVENous PRN Perry Ramirez MD    diphenhydrAMINE (BENADRYL) injection 12.5 mg 12.5 mg IntraVENous Q4H PRN Perry Ramirez MD    ondansetron (ZOFRAN ODT) tablet 4 mg 4 mg Oral Q4H PRN Perry Ramirez MD    magnesium hydroxide (MILK OF MAGNESIA) 400 mg/5 mL oral suspension 30 mL 30 mL Oral DAILY PRN Perry Ramirez MD         Review of Systems    As above; comprehensive ROS performed and o/w negative.     Objective    Vital signs for last 24 hours: /85 (BP 1 Location: Left arm, BP Patient Position: At rest)  Pulse 84  Temp 97.4 °F (36.3 °C)  Resp 18  SpO2 96%    Intake/Output this shift:  Current Shift:    Last 3 Shifts:      Data Review:   Recent Results (from the past 24 hour(s))  -EKG, 12 LEAD, INITIAL   Collection Time: 05/28/18  1:35 AM   Result Value Ref Range   Ventricular Rate 74 BPM   Atrial Rate 74 BPM   P-R Interval 146 ms   QRS Duration 80 ms   Q-T Interval 388 ms   QTC Calculation (Bezet) 430 ms   Calculated P Axis 43 degrees   Calculated R Axis -10 degrees   Calculated T Axis 18 degrees   Diagnosis Normal sinus rhythm  When compared with ECG of 22-FEB-2014 14:59,  Vent. rate has increased BY  27 BPM      -CBC WITH AUTOMATED DIFF   Collection Time: 05/28/18  1:53 AM   Result Value Ref Range   WBC 5.8 3.6 - 11.0 K/uL   RBC 3.59 (L) 3.80 - 5.20 M/uL   HGB 10.5 (L) 11.5 - 16.0 g/dL   HCT 32.4 (L) 35.0 - 47.0 %   MCV 90.3 80.0 - 99.0 FL   MCH 29.2 26.0 - 34.0 PG   MCHC 32.4 30.0 - 36.5 g/dL   RDW 17.1 (H) 11.5 - 14.5 %   PLATELET 58 (L) 363 - 400 K/uL   MPV 10.5 8.9 - 12.9 FL   NRBC 1.0 (H) 0  WBC   ABSOLUTE NRBC 0.06 (H) 0.00 - 0.01 K/uL   NEUTROPHILS 15 (L) 32 - 75 %   BAND NEUTROPHILS 4 0 - 6 %   LYMPHOCYTES 24 12 - 49 %   MONOCYTES 47 (H) 5 - 13 %   EOSINOPHILS 0 0 - 7 %   BASOPHILS 0 0 - 1 %   BLASTS 10 (H) 0 %   IMMATURE GRANULOCYTES 0 %   ABS. NEUTROPHILS 1.1 (L) 1.8 - 8.0 K/UL   ABS. LYMPHOCYTES 1.4 0.8 - 3.5 K/UL   ABS. MONOCYTES 2.7 (H) 0.0 - 1.0 K/UL   ABS. EOSINOPHILS 0.0 0.0 - 0.4 K/UL   ABS. BASOPHILS 0.0 0.0 - 0.1 K/UL   ABS. IMM. GRANS. 0.0 K/UL   DF MANUAL    RBC COMMENTS OVALOCYTES  PRESENT      RBC COMMENTS TEARDROP CELLS  PRESENT      RBC COMMENTS POLYCHROMASIA  1+      RBC COMMENTS ANISOCYTOSIS  1+      WBC COMMENTS Pathology Review Requested    Pathologist review An emerging acute leukemia. Recommend bone marrow examination. Thrombcytopenia and normocytic normochromic anemia consistent with above.  The case is discussed with Dr Yoly Fish in the ED at 06:35 on 5/28/2018.     -CK W/ CKMB & INDEX   Collection Time: 05/28/18  1:53 AM   Result Value Ref Range   CK 21 (L) 26 - 192 U/L   CK - MB <1.0 <3.6 NG/ML   CK-MB Index Cannot be calculated 0 - 2.5     -METABOLIC PANEL, COMPREHENSIVE   Collection Time: 05/28/18  1:53 AM   Result Value Ref Range   Sodium 140 136 - 145 mmol/L   Potassium 4.1 3.5 - 5.1 mmol/L   Chloride 104 97 - 108 mmol/L   CO2 25 21 - 32 mmol/L   Anion gap 11 5 - 15 mmol/L   Glucose 195 (H) 65 - 100 mg/dL   BUN 21 (H) 6 - 20 MG/DL   Creatinine 0.70 0.55 - 1.02 MG/DL   BUN/Creatinine ratio 30 (H) 12 - 20     GFR est AA >60 >60 ml/min/1.73m2   GFR est non-AA >60 >60 ml/min/1.73m2   Calcium 8.8 8.5 - 10.1 MG/DL   Bilirubin, total 0.4 0.2 - 1.0 MG/DL   ALT (SGPT) 40 12 - 78 U/L   AST (SGOT) 33 15 - 37 U/L   Alk. phosphatase 132 (H) 45 - 117 U/L   Protein, total 6.9 6.4 - 8.2 g/dL   Albumin 3.6 3.5 - 5.0 g/dL   Globulin 3.3 2.0 - 4.0 g/dL   A-G Ratio 1.1 1.1 - 2.2     -D DIMER   Collection Time: 05/28/18  1:53 AM   Result Value Ref Range   D-dimer 5.23 (H) 0.00 - 0.65 mg/L FEU   -TROPONIN I   Collection Time: 05/28/18  1:53 AM   Result Value Ref Range   Troponin-I, Qt. <0.04 <0.05 ng/mL   -TROPONIN I   Collection Time: 05/28/18 12:29 PM   Result Value Ref Range   Troponin-I, Qt. <0.04 <0.05 ng/mL       Physical Exam    NAD  Anicteric  Regular  CTA bilat  abd soft/ non-tender; no HSM  No edema  No neck LAD  Skin dry; ecchymosis right antecubital fossa  Neuro nonfocal      CTA of chest reviewed: no PE; history of L1 kyphoplasty    A/P  1. Thrombocytopenia and abnormal smear/ WBC differential  -will set up BM biopsy for tomorrow  -discussed with her /family  -suspicion for acute leukemia or advanced MDS; need bone marrow biopsy to make diagnosis    2. Chest pain  -as above  -CTA reviewed    3. Cardiac history    4.   Weakness, fatigue  -likely secondary to process underlying issue #1

## 2018-05-28 NOTE — ED NOTES
Pt requesting additional pain medication. Pt provided with pillow and orders obtained from Dr. Bryce Jeffrey.

## 2018-05-28 NOTE — ED NOTES
Lab called to provide update on CBC. Pathologist will be here within the hour to review CBC results. Dr. Aidan Winter notified.

## 2018-05-29 NOTE — PROGRESS NOTES
Bedside shift change report given to Jenny Gant rn (oncoming nurse) by Tonia Mckeon (offgoing nurse). Report included the following information SBAR.

## 2018-05-29 NOTE — PROGRESS NOTES
Spoke with CT, she stated she doesn't do the CT guided bone marrow biopsy's but to make pt NPO at midnight just in case. Told patient and her and her daughter were in agreement. I told them I would call CT again between 6 and 7 to check when procedure is scheduled for and if she can have breakfast.      2:03 AM   paged regarding critical platelet of [phone]. Awaiting call back. 2:04 AM  Spoke with , no new orders received. Will continue to monitor pt.

## 2018-05-29 NOTE — ROUTINE PROCESS
TRANSFER - OUT REPORT:    Verbal report given to Julia Courser (name) on Frederico Angelucci  being transferred to [address] [place] [address] (unit) for routine progression of care       Report consisted of patients Situation, Background, Assessment and   Recommendations(SBAR). Information from the following report(s) Procedure Summary and MAR was reviewed with the receiving nurse. Lines:   Peripheral IV 05/29/18 Left Wrist (Active)        Opportunity for questions and clarification was provided, post bone marrow biopsy in CT main.     Patient transported with:   O2 @ RA liters

## 2018-05-29 NOTE — H&P
Radiology History and Physical    Patient: Percy Hardy 68 y.o. female     Referring Physician: Husam Craig. Chief Complaint:   Chief Complaint   Patient presents with    Chest Pain       History of Present Illness: abnormal blast cells in peripheral blood. History:    Past Medical History:   Diagnosis Date    CAD (coronary artery disease)     Cancer ([facility] [facility]     basal cell cancer face arm and chest    Chronic kidney disease     kidney stones    Diabetes ([facility] [facility]     Hypertension     Other ill-defined conditions(799.89)     hx of kidney stones    Other ill-defined conditions(799.89)     elevated cholesterol    Psychiatric disorder     anxiety    Stroke ([facility] [facility]     Unspecified adverse effect of anesthesia     ? novacaine rapid heart beat     Family History   Problem Relation Age of Onset    Cancer Father      colon cancer    Cancer Mother      lymphoma     Social History     Social History    Marital status:      Spouse name: N/A    Number of children: N/A    Years of education: N/A     Occupational History    Not on file. Social History Main Topics    Smoking status: Never Smoker    Smokeless tobacco: Never Used    Alcohol use No    Drug use: No    Sexual activity: Not on file     Other Topics Concern    Not on file     Social History Narrative       Allergies:    Allergies   Allergen Reactions    Codeine Nausea Only    Sulfa (Sulfonamide Antibiotics) Nausea Only       Current Medications:  Current Facility-Administered Medications   Medication Dose Route Frequency    fentaNYL citrate (PF) injection 200 mcg  200 mcg IntraVENous RAD PRN    midazolam (VERSED) injection 5 mg  5 mg IntraVENous RAD PRN    0.9% sodium chloride infusion  25 mL/hr IntraVENous CONTINUOUS    lidocaine (XYLOCAINE) 10 mg/mL (1 %) injection 10 mL  10 mL SubCUTAneous RAD ONCE    amLODIPine (NORVASC) tablet 5 mg  5 mg Oral DAILY    carvedilol (COREG) tablet 3.125 mg  3.125 mg Oral BID WITH MEALS    cyclobenzaprine (FLEXERIL) tablet 10 mg  10 mg Oral TID PRN    losartan (COZAAR) tablet 100 mg  100 mg Oral DAILY    pantoprazole (PROTONIX) tablet 40 mg  40 mg Oral DAILY    sertraline (ZOLOFT) tablet 100 mg  100 mg Oral BID    sodium chloride (NS) flush 5-10 mL  5-10 mL IntraVENous Q8H    sodium chloride (NS) flush 5-10 mL  5-10 mL IntraVENous PRN    morphine injection 2 mg  2 mg IntraVENous Q4H PRN    naloxone (NARCAN) injection 0.4 mg  0.4 mg IntraVENous PRN    diphenhydrAMINE (BENADRYL) injection 12.5 mg  12.5 mg IntraVENous Q4H PRN    ondansetron (ZOFRAN ODT) tablet 4 mg  4 mg Oral Q4H PRN    magnesium hydroxide (MILK OF MAGNESIA) 400 mg/5 mL oral suspension 30 mL  30 mL Oral DAILY PRN    [START ON 5/30/2018] aspirin delayed-release tablet 81 mg  81 mg Oral DAILY    glucose chewable tablet 16 g  4 Tab Oral PRN    dextrose (D50W) injection syrg 12.5-25 g  12.5-25 g IntraVENous PRN    glucagon (GLUCAGEN) injection 1 mg  1 mg IntraMUSCular PRN    insulin lispro (HUMALOG) injection   SubCUTAneous AC&HS    clonazePAM (KlonoPIN) tablet 0.5 mg  0.5 mg Oral TID PRN    lidocaine (LIDODERM) 5 % patch 2 Patch  2 Patch TransDERmal Q24H    oxyCODONE IR (ROXICODONE) tablet 10 mg  10 mg Oral Q4H PRN    acetaminophen (TYLENOL) tablet 650 mg  650 mg Oral Q6H        Physical Exam:  Blood pressure 158/64, pulse 91, temperature 98.4 °F (36.9 °C), resp. rate 24, height 5' 9\" (1.753 m), weight 78.9 kg (174 lb), SpO2 93 %, not currently breastfeeding.   GENERAL: alert, cooperative, no distress, appears stated age, LUNG: clear to auscultation bilaterally, HEART: regular rate and rhythm      Alerts:    Hospital Problems  Date Reviewed: 5/29/2018          Codes Class Noted POA    Leukemia [facility]) ICD-10-CM: C95.90  ICD-9-CM: 208.90  5/28/2018 Unknown              Laboratory:      Recent Labs      05/29/18   0120   HGB  10.9*   HCT  32.4*   WBC  5.5   PLT  40*   BUN  19   CREA  0.65   K  3.8         Plan of Care/Planned Procedure:  Risks, benefits, and alternatives reviewed with patient and she agrees to proceed with the procedure. Full dictated report to follow.

## 2018-05-29 NOTE — PROGRESS NOTES
Primary Nurse Rachel Berry RN and Ian RN performed a dual skin assessment on this patient No impairment noted  Didier score is 21

## 2018-05-29 NOTE — PROGRESS NOTES
Hospitalist Progress Note                               Ilya Reyes MD                                     Answering service: [phone]                               OR [phone] from in house phone                               Cell: [phone]              Date of Service:  2018  NAME:  Federico Bejarano  :  1945  MRN:  [id number]      Admission Summary:   Federico Bejarano is a 68 y.o. female with PMH including CAD s/p multiple stents, HTN, HLD, NIDDM2, CKD, anxiety, GERD; presenting to the ED with complaint of sharp left side chest pains. Patient reports 2-3 week history of generalized joint aches and pains, specifically knees and right clavicle. Was told by PCP it was probably arthritis. Also reporting problems with intermittent night sweats over the last several weeks, bad enough she is soaking through the sheets. The reason she came to the ED today was for 3-4 day history intermittent sharp left side chest wall pains. Described as stabbing, 10/10 intensity, worse with deep inspiration / movement / or palpation, non-exertional. Feels like she can't breathe as deep as usual because of the pain. Admits to doing some spring cleaning recently -- moving luggage around a few weeks ago and also [facility] lots [facility]. Specifically says she was carrying piles of clothing over her left arm and it was very heavy. Denies any trauma or falls. Denies any history of fevers, chills, dizziness, syncope/near-syncope, unintentional weight loss, abdominal pain, N/V/D, bleeding gums, blood in stools, swelling, or other issues. Reason for follow up:anemia and thrombocytopenia       Federico Bejarano continues to complain pain on the left back ,infra mammary area. No skin rashes. She is anxious about about her diagnosis. I have reassessed her in the afternoon,she seems to be having anxiety attack and she felt better after we talked for several minutes.  and son in the room     Assessment & Plan:   Anemia and thrombocytopenia with blasts in the peripheral blood concerning for acute leukemia,MDS etc.. -S/p bmbx . Oncology on board.     Uncontrolled pain originating on the upper back radiating to the left axillary and infra mammary area. Appears musculoskeletal.  -No rashes or vesicles   -CTA chest,CXR and ribs xray all negative. Pain control. On flexeril    Anxiety: she takes lorazepam qhs at home. She tells me she had valium for emotional crisis when her dad . LOrazepam 0.5 mg tid prn. CAD s/p multiple stents,continue coreg,losartan,on asa,prasugrel on hold due to severe thrombocytopenia/bx procedure. .   HTN,continue home regimen  DM II with hyperglycemia: metformin on hold. Accucheks and Humalog sliding scale AC  &HS          Diet:diabetic  Code status: full  DVT prophylaxis: scd  Care Plan discussed with: patient,and family,nurse. Discharge planning: pending test result and clinical progress    Hospital Problems  Date Reviewed: 2018          Codes Class Noted POA    Leukemia [facility]) ICD-10-CM: C95.90  ICD-9-CM: 208.90  2018 Unknown                Review of Systems:   A comprehensive review of systems was negative except for that written in the HPI. Physical Examination:      Last 24hrs VS reviewed since prior progress note. Most recent are:  Visit Vitals    /77 (BP 1 Location: Left arm, BP Patient Position: At rest)    Pulse 76    Temp 97.7 °F (36.5 °C)    Resp 20    Ht 5' 9\" (1.753 m)    Wt 78.9 kg (174 lb)    SpO2 90%    Breastfeeding No    BMI 25.7 kg/m2           Constitutional:  No acute distress, cooperative, pleasant    HEENT: Head is a traumatic,  Un icteric sclera. Pink conjunctiva,no erythema or discharge. Oral mucous moist, oropharynx benign. Neck supple,    Resp:  CTA bilaterally. No wheezing/rhonchi/rales.  No accessory muscle use   CV:  Regular rhythm, normal rate, no murmurs, gallops, rubs    GI:  Soft, non distended, non tender. normoactive bowel sounds, no hepatosplenomegaly    :  No CVA or suprapubic tenderness   Skin  :  NO rashes,vesicles. Musculoskeletal:  No edema, warm, 2+ pulses throughout    Neurologic:  AAOx3, CN II-XII reviewed. Moves all extremities. Psych:  Anxious     No intake or output data in the 24 hours ending 05/29/18 1659       Data Review:    Review and/or order of clinical lab test  Review and/or order of tests in the radiology section of [facility]  Review and/or order of tests in the medicine section of [facility]      Labs:     Recent Labs      05/29/18   0120  05/28/18   0153   WBC  5.5  5.8   HGB  10.9*  10.5*   HCT  32.4*  32.4*   PLT  40*  58*     Recent Labs      05/29/18   0120  05/28/18   0153   NA  138  140   K  3.8  4.1   CL  104  104   CO2  24  25   BUN  19  21*   CREA  0.65  0.70   GLU  158*  195*   CA  8.5  8.8   MG  1.7   --      Recent Labs      05/29/18   0120  05/28/18   0153   SGOT  56*  33   ALT  55  40   AP  100  132*   TBILI  0.7  0.4   TP  6.4  6.9   ALB  3.2*  3.6   GLOB  3.2  3.3     No results for input(s): INR, PTP, APTT in the last 72 hours. No lab exists for component: INREXT   Recent Labs      05/28/18   1745   TIBC  275   PSAT  32      Lab Results   Component Value Date/Time    Folate 5.1 05/28/2018 05:35 PM      No results for input(s): PH, PCO2, PO2 in the last 72 hours.   Recent Labs      05/28/18   1229  05/28/18   0153   CPK   --   21*   CKNDX   --   Cannot be calculated   TROIQ  <0.04  <0.04     No results found for: CHOL, CHOLX, CHLST, CHOLV, HDL, LDL, LDLC, DLDLP, TGLX, TRIGL, TRIGP, CHHD, CHHDX  Lab Results   Component Value Date/Time    Glucose (POC) 225 (H) 05/29/2018 04:50 PM    Glucose (POC) 187 (H) 05/29/2018 11:39 AM    Glucose (POC) 164 (H) 05/29/2018 10:07 AM    Glucose (POC) 228 (H) 05/29/2018 06:46 AM    Glucose (POC) 252 (H) 05/28/2018 09:22 PM     No results found for: COLOR, APPRN, SPGRU, REFSG, CARA, PROTU, GLUCU, KETU, BILU, UROU, NAGA, LEUKU, GLUKE, EPSU, BACTU, WBCU, RBCU, CASTS, UCRY      Medications Reviewed:     Current Facility-Administered Medications   Medication Dose Route Frequency    clonazePAM (KlonoPIN) tablet 0.5 mg  0.5 mg Oral Q8H PRN    amLODIPine (NORVASC) tablet 5 mg  5 mg Oral DAILY    carvedilol (COREG) tablet 3.125 mg  3.125 mg Oral BID WITH MEALS    cyclobenzaprine (FLEXERIL) tablet 10 mg  10 mg Oral TID PRN    losartan (COZAAR) tablet 100 mg  100 mg Oral DAILY    pantoprazole (PROTONIX) tablet 40 mg  40 mg Oral DAILY    sertraline (ZOLOFT) tablet 100 mg  100 mg Oral BID    sodium chloride (NS) flush 5-10 mL  5-10 mL IntraVENous Q8H    sodium chloride (NS) flush 5-10 mL  5-10 mL IntraVENous PRN    morphine injection 2 mg  2 mg IntraVENous Q4H PRN    naloxone (NARCAN) injection 0.4 mg  0.4 mg IntraVENous PRN    diphenhydrAMINE (BENADRYL) injection 12.5 mg  12.5 mg IntraVENous Q4H PRN    ondansetron (ZOFRAN ODT) tablet 4 mg  4 mg Oral Q4H PRN    magnesium hydroxide (MILK OF MAGNESIA) 400 mg/5 mL oral suspension 30 mL  30 mL Oral DAILY PRN    [START ON 5/30/2018] aspirin delayed-release tablet 81 mg  81 mg Oral DAILY    glucose chewable tablet 16 g  4 Tab Oral PRN    dextrose (D50W) injection syrg 12.5-25 g  12.5-25 g IntraVENous PRN    glucagon (GLUCAGEN) injection 1 mg  1 mg IntraMUSCular PRN    insulin lispro (HUMALOG) injection   SubCUTAneous AC&HS    lidocaine (LIDODERM) 5 % patch 2 Patch  2 Patch TransDERmal Q24H    oxyCODONE IR (ROXICODONE) tablet 10 mg  10 mg Oral Q4H PRN    acetaminophen (TYLENOL) tablet 650 mg  650 mg Oral Q6H     ______________________________________________________________________  EXPECTED LENGTH OF STAY: 2d 21h  ACTUAL LENGTH OF STAY:          1                 Shraddha Damico MD

## 2018-05-29 NOTE — PROGRESS NOTES
Hematology-Oncology Progress Note    Trenton Velázquez  1945  [id number]  5/29/2018    Follow-up for: anemia     [x]        Chart notes since last visit reviewed   [x]        Medications reviewed for allergies and interactions       Case discussed with the following:         []                            [x]        Nursing Staff                                                                         []        Pathologist                                                                        []        FAMILY      Subjective:     Spoke with patient who complains of: pt.  Is comfortable, seen in radiology    Objective:   Patient Vitals for the past 24 hrs:   BP Temp Pulse Resp SpO2 Height Weight   05/29/18 0917 146/59 - 87 24 93 % - -   05/29/18 0903 131/63 - 86 - 97 % - -   05/29/18 0859 (!) 130/95 - 82 26 98 % - -   05/29/18 0852 136/64 - - - - - -   05/29/18 0851 - - 83 18 96 % - -   05/29/18 0751 158/64 98.4 °F (36.9 °C) 91 24 93 % - -   05/29/18 0739 - - - - - 5' 9\" (1.753 m) 78.9 kg (174 lb)   05/29/18 0041 168/85 98 °F (36.7 °C) 80 20 98 % - -   05/28/18 2007 153/80 97.5 °F (36.4 °C) 72 18 94 % - -   05/28/18 1405 152/80 97.7 °F (36.5 °C) 80 18 94 % - -   05/28/18 1250 168/85 97.4 °F (36.3 °C) 84 18 96 % - -   05/28/18 1202 152/80 98 °F (36.7 °C) 78 18 95 % - -   05/28/18 1030 167/88 - 75 19 92 % - -   05/28/18 1000 153/76 - 72 21 92 % - -       REVIEW OF SYSTEMS:    Constitutional: negative fever, negative chills, negative weight loss  Eyes:   negative visual changes  ENT:   negative sore throat, tongue or lip swelling  Respiratory:  negative cough, negative dyspnea  Cards:  negative for chest pain, palpitations, lower extremity edema  GI:   negative for nausea, vomiting, diarrhea, and abdominal pain  Neuro:  negative for headaches, dizziness, vertigo  [x]                        Full ROS o/w normal/non contributor    Constitutional:  Patient looks  []        Sick  []        Frail  [x]        Better []        Depressed    HEENT:  [x]   NC                         []   AT               []    ALOPECIA           Eyes: [x]   Normal               []    Icteric  Oropharynx: [x]    Normal                  []  Thrush               []   Dry  Mucositis: [x]    None                 Grade: []        I  []        II  []        III  []        IV  Neck:   [x]   Supple                  []  Rigid               JVD:    [x]   ABSENT       []   PRESENT  Lymphadenopathy:   []   None Noted            []   PRESENT      Edema:     [x]   NONE       []   PRESENT  Skin:  Intact [x]           Purpura []        Rash: [x]   ABSENT       []  PRESENT  Neuro:  [x]        Normal  []        Confused      Available labs reviewed:  Labs:    Recent Results (from the past 24 hour(s))   TROPONIN I    Collection Time: 05/28/18 12:29 PM   Result Value Ref Range    Troponin-I, Qt. <0.04 <0.05 ng/mL   GLUCOSE, POC    Collection Time: 05/28/18  4:27 PM   Result Value Ref Range    Glucose (POC) 195 (H) 65 - 100 mg/dL    Performed by Preeti Garvin    LD    Collection Time: 05/28/18  5:35 PM   Result Value Ref Range     (H) 81 - 246 U/L   VITAMIN B12    Collection Time: 05/28/18  5:35 PM   Result Value Ref Range    Vitamin B12 500 193 - 986 pg/mL   FOLATE    Collection Time: 05/28/18  5:35 PM   Result Value Ref Range    Folate 5.1 5.0 - 21.0 ng/mL   IRON PROFILE    Collection Time: 05/28/18  5:45 PM   Result Value Ref Range    Iron 88 35 - 150 ug/dL    TIBC 275 250 - 450 ug/dL    Iron % saturation 32 20 - 50 %   GLUCOSE, POC    Collection Time: 05/28/18  9:22 PM   Result Value Ref Range    Glucose (POC) 252 (H) 65 - 100 mg/dL    Performed by ELAN SESAY    CBC WITH AUTOMATED DIFF    Collection Time: 05/29/18  1:20 AM   Result Value Ref Range    WBC 5.5 3.6 - 11.0 K/uL    RBC 3.69 (L) 3.80 - 5.20 M/uL    HGB 10.9 (L) 11.5 - 16.0 g/dL    HCT 32.4 (L) 35.0 - 47.0 %    MCV 87.8 80.0 - 99.0 FL    MCH 29.5 26.0 - 34.0 PG    MCHC 33.6 30.0 - 36.5 g/dL    RDW 17.0 (H) 11.5 - 14.5 %    PLATELET 40 (LL) 655 - 400 K/uL    MPV 10.3 8.9 - 12.9 FL    NRBC 0.5 (H) 0  WBC    ABSOLUTE NRBC 0.03 (H) 0.00 - 0.01 K/uL    NEUTROPHILS 10 (L) 32 - 75 %    BAND NEUTROPHILS 2 0 - 6 %    LYMPHOCYTES 27 12 - 49 %    MONOCYTES 49 (H) 5 - 13 %    EOSINOPHILS 2 0 - 7 %    BASOPHILS 0 0 - 1 %    METAMYELOCYTES 1 (H) 0 %    MYELOCYTES 1 (H) 0 %    PROMYELOCYTES 3 (H) 0 %    BLASTS 5 (H) 0 %    IMMATURE GRANULOCYTES 0 %    ABS. NEUTROPHILS 0.7 (L) 1.8 - 8.0 K/UL    ABS. LYMPHOCYTES 1.5 0.8 - 3.5 K/UL    ABS. MONOCYTES 2.7 (H) 0.0 - 1.0 K/UL    ABS. EOSINOPHILS 0.1 0.0 - 0.4 K/UL    ABS. BASOPHILS 0.0 0.0 - 0.1 K/UL    ABS. IMM. GRANS. 0.0 K/UL    DF MANUAL      RBC COMMENTS ANISOCYTOSIS  1+        RBC COMMENTS OVALOCYTES  PRESENT        RBC COMMENTS POLYCHROMASIA  PRESENT        RBC COMMENTS TEARDROP CELLS  PRESENT        WBC COMMENTS REACTIVE LYMPHS     MAGNESIUM    Collection Time: 05/29/18  1:20 AM   Result Value Ref Range    Magnesium 1.7 1.6 - 2.4 mg/dL   METABOLIC PANEL, COMPREHENSIVE    Collection Time: 05/29/18  1:20 AM   Result Value Ref Range    Sodium 138 136 - 145 mmol/L    Potassium 3.8 3.5 - 5.1 mmol/L    Chloride 104 97 - 108 mmol/L    CO2 24 21 - 32 mmol/L    Anion gap 10 5 - 15 mmol/L    Glucose 158 (H) 65 - 100 mg/dL    BUN 19 6 - 20 MG/DL    Creatinine 0.65 0.55 - 1.02 MG/DL    BUN/Creatinine ratio 29 (H) 12 - 20      GFR est AA >60 >60 ml/min/1.73m2    GFR est non-AA >60 >60 ml/min/1.73m2    Calcium 8.5 8.5 - 10.1 MG/DL    Bilirubin, total 0.7 0.2 - 1.0 MG/DL    ALT (SGPT) 55 12 - 78 U/L    AST (SGOT) 56 (H) 15 - 37 U/L    Alk.  phosphatase 100 45 - 117 U/L    Protein, total 6.4 6.4 - 8.2 g/dL    Albumin 3.2 (L) 3.5 - 5.0 g/dL    Globulin 3.2 2.0 - 4.0 g/dL    A-G Ratio 1.0 (L) 1.1 - 2.2     GLUCOSE, POC    Collection Time: 05/29/18  6:46 AM   Result Value Ref Range    Glucose (POC) 228 (H) 65 - 100 mg/dL    Performed by Marshal De Luna        Available Xrays reviewed:    Chemotherapy monitored and toxicities assessed:    Assessment and Plan   1. Anemia and thrombocytopenia. .. With 5% blasts in periphery. .. Pt. Just had her bone marrow bx done in radiology, the pt. Did well with the procedure. We will d/w pathology to obtain a preliminary result later today.   2. Linette Miller MD

## 2018-05-30 NOTE — PROGRESS NOTES
Bedside shift change report given to Ricki RN (oncoming nurse) by Migue Adan RN (offgoing nurse). Report included the following information SBAR, Kardex, ED Summary, [facility] and Recent Results. 4:48 AM: Received call from Danitza Fall in the lab regarding critical platelet count of 31. Paged hospitalist and notified Dr. Vern Vazquez when he returned my call. No orders were received, will continue to monitor patient and pass the lab value on to day shift in report.

## 2018-05-30 NOTE — PROGRESS NOTES
Hospitalist Progress Note                               Marcia Paredes MD                                     Answering service: [phone]                               [place] [phone] from in house phone                               Cell: [phone]              Date of Service:  2018  NAME:  Natali Higuera  :  1945  MRN:  [id number]      Admission Summary:   Natali Higuera is a 68 y.o. female with PMH including CAD s/p multiple stents, HTN, HLD, NIDDM2, CKD, anxiety, GERD; presenting to the ED with complaint of sharp left side chest pains. Patient reports 2-3 week history of generalized joint aches and pains, specifically knees and right clavicle. Was told by PCP it was probably arthritis. Also reporting problems with intermittent night sweats over the last several weeks, bad enough she is soaking through the sheets. The reason she came to the ED today was for 3-4 day history intermittent sharp left side chest wall pains. Described as stabbing, 10/10 intensity, worse with deep inspiration / movement / or palpation, non-exertional. Feels like she can't breathe as deep as usual because of the pain. Admits to doing some spring cleaning recently -- moving luggage around a few weeks ago and also moving lots of heavy winter clothes up to the attic last week. Specifically says she was carrying piles of clothing over her left arm and it was very heavy. Denies any trauma or falls. Denies any history of fevers, chills, dizziness, syncope/near-syncope, unintentional weight loss, abdominal pain, N/V/D, bleeding gums, blood in stools, swelling, or other issues. Reason for follow up:anemia and thrombocytopenia       Natali Higuera is interactive,but dozing off intermittently as she just had morphine. Family at bedside.      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     Uncontrolled pain originating on the upper back radiating to the left axillary and infra mammary area. Appears musculoskeletal.  -No rashes or vesicles   -CTA chest,CXR and ribs xray all negative. Pain control. On flexeril    Anxiety: she takes lorazepam qhs at home. She tells me she had valium for emotional crisis when her dad . LOrazepam 0.5 mg tid prn. CAD s/p multiple stents,continue coreg,losartan,on asa,prasugrel on hold due to severe thrombocytopenia/bx procedure. .   HTN,continue home regimen  DM II with hyperglycemia: metformin on hold. Accucheks and Humalog sliding scale AC  &HS          Diet:diabetic  Code status: full  DVT prophylaxis: scd  Care Plan discussed with: patient,and family,nurse. Discharge planning: pending test result and clinical progress    Hospital Problems  Date Reviewed: 2018          Codes Class Noted POA    Leukemia [facility]) ICD-10-CM: C95.90  ICD-9-CM: 208.90  2018 Unknown                Review of Systems:   A comprehensive review of systems was negative except for that written in the HPI. Physical Examination:      Last 24hrs VS reviewed since prior progress note. Most recent are:  Visit Vitals    /62 (BP 1 Location: Left arm, BP Patient Position: At rest)    Pulse 80    Temp 98.3 °F (36.8 °C)    Resp 17    Ht 5' 9\" (1.753 m)    Wt 78.9 kg (174 lb)    SpO2 91%    Breastfeeding No    BMI 25.7 kg/m2           Constitutional:  No acute distress, cooperative, pleasant    HEENT: Head is a traumatic,  Un icteric sclera. Pink conjunctiva,no erythema or discharge. Oral mucous moist, oropharynx benign. Neck supple,    Resp:  CTA bilaterally. No wheezing/rhonchi/rales. No accessory muscle use   CV:  Regular rhythm, normal rate, no murmurs, gallops, rubs    GI:  Soft, non distended, non tender. normoactive bowel sounds, no hepatosplenomegaly    :  No CVA or suprapubic tenderness   Skin  :  NO rashes,vesicles.     Musculoskeletal:  No edema, warm, 2+ pulses throughout    Neurologic:  AAOx3, CN II-XII reviewed. Moves all extremities. Psych:  Anxious         Intake/Output Summary (Last 24 hours) at 05/30/18 1149  Last data filed at 05/29/18 1802   Gross per 24 hour   Intake              480 ml   Output                0 ml   Net              480 ml          Data Review:    Review and/or order of clinical lab test  Review and/or order of tests in the radiology section of CPT  Review and/or order of tests in the medicine section of CPT      Labs:     Recent Labs      05/30/18   0406  05/29/18   0120   WBC  7.3  5.5   HGB  9.4*  10.9*   HCT  28.6*  32.4*   PLT  31*  40*     Recent Labs      05/29/18   0120  05/28/18   0153   NA  138  140   K  3.8  4.1   CL  104  104   CO2  24  25   BUN  19  21*   CREA  0.65  0.70   GLU  158*  195*   CA  8.5  8.8   MG  1.7   --      Recent Labs      05/29/18   0120  05/28/18   0153   SGOT  56*  33   ALT  55  40   AP  100  132*   TBILI  0.7  0.4   TP  6.4  6.9   ALB  3.2*  3.6   GLOB  3.2  3.3     No results for input(s): INR, PTP, APTT in the last 72 hours. No lab exists for component: INREXT, INREXT   Recent Labs      05/28/18   1745   TIBC  275   PSAT  32      Lab Results   Component Value Date/Time    Folate 5.1 05/28/2018 05:35 PM      No results for input(s): PH, PCO2, PO2 in the last 72 hours.   Recent Labs      05/28/18   1229  05/28/18   0153   CPK   --   21*   CKNDX   --   Cannot be calculated   TROIQ  <0.04  <0.04     No results found for: CHOL, CHOLX, CHLST, CHOLV, HDL, LDL, LDLC, DLDLP, TGLX, TRIGL, TRIGP, CHHD, CHHDX  Lab Results   Component Value Date/Time    Glucose (POC) 241 (H) 05/30/2018 11:04 AM    Glucose (POC) 197 (H) 05/30/2018 06:27 AM    Glucose (POC) 188 (H) 05/29/2018 09:20 PM    Glucose (POC) 225 (H) 05/29/2018 04:50 PM    Glucose (POC) 187 (H) 05/29/2018 11:39 AM     No results found for: COLOR, APPRN, SPGRU, REFSG, CARA, PROTU, GLUCU, Izell Skates, BILU, UROU, NAGA, [address], EPSU, BACTU, WBCU, RBCU, CASTS, UCRY      Medications Reviewed:     Current Facility-Administered Medications   Medication Dose Route Frequency    clonazePAM (KlonoPIN) tablet 0.5 mg  0.5 mg Oral Q8H PRN    amLODIPine (NORVASC) tablet 5 mg  5 mg Oral DAILY    carvedilol (COREG) tablet 3.125 mg  3.125 mg Oral BID WITH MEALS    cyclobenzaprine (FLEXERIL) tablet 10 mg  10 mg Oral TID PRN    losartan (COZAAR) tablet 100 mg  100 mg Oral DAILY    pantoprazole (PROTONIX) tablet 40 mg  40 mg Oral DAILY    sertraline (ZOLOFT) tablet 100 mg  100 mg Oral BID    sodium chloride (NS) flush 5-10 mL  5-10 mL IntraVENous Q8H    sodium chloride (NS) flush 5-10 mL  5-10 mL IntraVENous PRN    morphine injection 2 mg  2 mg IntraVENous Q4H PRN    naloxone (NARCAN) injection 0.4 mg  0.4 mg IntraVENous PRN    diphenhydrAMINE (BENADRYL) injection 12.5 mg  12.5 mg IntraVENous Q4H PRN    ondansetron (ZOFRAN ODT) tablet 4 mg  4 mg Oral Q4H PRN    magnesium hydroxide (MILK OF MAGNESIA) 400 mg/5 mL oral suspension 30 mL  30 mL Oral DAILY PRN    glucose chewable tablet 16 g  4 Tab Oral PRN    dextrose (D50W) injection syrg 12.5-25 g  12.5-25 g IntraVENous PRN    glucagon (GLUCAGEN) injection 1 mg  1 mg IntraMUSCular PRN    insulin lispro (HUMALOG) injection   SubCUTAneous AC&HS    lidocaine (LIDODERM) 5 % patch 2 Patch  2 Patch TransDERmal Q24H    oxyCODONE IR (ROXICODONE) tablet 10 mg  10 mg Oral Q4H PRN    acetaminophen (TYLENOL) tablet 650 mg  650 mg Oral Q6H     ______________________________________________________________________  EXPECTED LENGTH OF STAY: 2d 21h  ACTUAL LENGTH OF STAY:          2                 Dayanara Thompson MD

## 2018-05-30 NOTE — PROGRESS NOTES
Problem: Falls - Risk of  Goal: *Absence of Falls  Document Valerie Fall Risk and appropriate interventions in the flowsheet.    Outcome: Progressing Towards Goal  Fall Risk Interventions:            Medication Interventions: Patient to call before getting OOB, Evaluate medications/consider consulting pharmacy, Teach patient to arise slowly         History of Falls Interventions: Door open when patient unattended, Bed/chair exit alarm

## 2018-05-30 NOTE — PROGRESS NOTES
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   No bleeding.    -O-    Patient Vitals for the past 24 hrs:   Temp Pulse Resp BP SpO2   05/30/18 0836 98.3 °F (36.8 °C) 80 17 127/62 91 %   05/30/18 0712 98.7 °F (37.1 °C) 88 18 147/77 92 %   05/30/18 0154 - - - - 90 %   05/30/18 0145 99.2 °F (37.3 °C) 86 18 133/63 (!) 80 %   05/29/18 2107 99.5 °F (37.5 °C) 88 18 150/77 94 %   05/29/18 1353 97.7 °F (36.5 °C) 76 20 156/77 90 %   05/29/18 1102 99.2 °F (37.3 °C) 86 20 138/80 94 %   05/29/18 0958 98.2 °F (36.8 °C) 88 20 165/86 93 %   05/29/18 0946 - 85 20 145/67 95 %   05/29/18 0930 - 88 20 132/49 94 %   05/29/18 0917 - 87 24 146/59 93 %   05/29/18 0903 - 86 - 131/63 97 %        Gen: nad  Chest: bilateral breath sounds present  Cardiac: rrr  Abd: soft, mild discomfort to deep palpation diffusely    -Labs-    Recent Labs      05/30/18   0406  05/29/18   0120  05/28/18   0153   WBC  7.3  5.5  5.8   HGB  9.4*  10.9*  10.5*   PLT  31*  40*  58*   ANEU  0.5*  0.7*  1.1*   NA   --   138  140   K   --   3.8  4.1   GLU   --   158*  195*   BUN   --   19  21*   CREA   --   0.65  0.70   ALT   --   55  40   SGOT   --   56*  33   TBILI   --   0.7  0.4   AP   --   100  132*   CA   --   8.5  8.8   MG   --   1.7   --          -Assessment + Plan-     *) suspected AML: discussed with Dr Natty Mcmillan of pathology  ---- aspirate showed ~36% blasts, suspect AML  ---- not felt to be consistent with APL  ---- continue supportive care including transfusions as needed  ---- therapeutic decisions pending additional data    *) thrombocytopenia- transfusion prn <15 or <50 with bleeding  ---- hold asa    *) anemia- transfuse prn hgb <8

## 2018-05-30 NOTE — PROGRESS NOTES
Spiritual Care Partner Volunteer visited patient in Rm 624 on 5/30/2018.   Documented by:  Chaplain Blanco MDiv, MS, [address] ([id number])

## 2018-05-30 NOTE — DIABETES MGMT
DTC Progress Note    Recommendations/ Comments: Chart reviewed due to hyperglycemia. Blood sugars mainly >180 and pt has required 7 units of correction over the last 24 hours. If appropriate, please consider changing correction scale to resistant scale. Current hospital DM medication: correction scale Humalog, normal sensitivity. Chart reviewed on Abigail Daniels. Patient is a 68 y.o. female with known diabetes on Metformin 1000 mg BID at home. A1c:   Lab Results   Component Value Date/Time    Hemoglobin A1c 6.5 (H) 11/22/2013 11:23 AM       Recent Glucose Results: Lab Results   Component Value Date/Time    GLUCPOC 197 (H) 05/30/2018 06:27 AM    GLUCPOC 188 (H) 05/29/2018 09:20 PM    GLUCPOC 225 (H) 05/29/2018 04:50 PM        Lab Results   Component Value Date/Time    Creatinine 0.65 05/29/2018 01:20 AM     Estimated Creatinine Clearance: 81.8 mL/min (based on Cr of 0.65). Active Orders   Diet    DIET DIABETIC CONSISTENT CARB Regular; 2 GM NA (House Low NA)        PO intake: Patient Vitals for the past 72 hrs:   % Diet Eaten   05/29/18 1802 100 %   05/29/18 1207 100 %       Will continue to follow as needed.     Thank you  Amanda Sams RD, CDE

## 2018-05-31 PROBLEM — C92.00 AML (ACUTE MYELOID LEUKEMIA) (HCC): Status: ACTIVE | Noted: 2018-01-01

## 2018-05-31 NOTE — PROGRESS NOTES
Bedside shift change report given to Ricki RN (oncoming nurse) by Tremaine Witt RN (offgoing nurse). Report included the following information SBAR, Kardex, ED Summary, [facility] and Recent Results.

## 2018-05-31 NOTE — PROGRESS NOTES
Bedside shift change report given to Jeremiah Garcia (oncoming nurse) by Ricki (offgoing nurse). Report included the following information SBAR.     18 Notified Dr. Ron Lipscomb that pt's RR is 28, clonopin given. 46 Notified Dr. Ron Lipscomb of pt's BPs, RR continues to be elevated and pt wheezy at this time. CXR to be ordered. 56 Notified Dr. Ron Lipscomb of pt's CXR results.

## 2018-05-31 NOTE — PROGRESS NOTES
TRANSFER - IN REPORT:    Verbal report received on Eugene Hun  being received from [address] (unit) for routine progression of care      Report consisted of patients Situation, Background, Assessment and   Recommendations(SBAR). Information from the following report(s) SBAR, Kardex, ED Summary, Intake/Output, MAR and Recent Results was reviewed with the receiving nurse. Opportunity for questions and clarification was provided.

## 2018-05-31 NOTE — PROGRESS NOTES
TRANSFER - OUT REPORT:    Verbal report given to Dov Clarke (name) on Anh Edmondson  being transferred to ICU (unit) for routine progression of care       Report consisted of patients Situation, Background, Assessment and   Recommendations(SBAR). Information from the following report(s) SBAR was reviewed with the receiving nurse. Lines:   Peripheral IV 05/29/18 Left Wrist (Active)   Site Assessment Clean, dry, & intact 5/31/2018  8:55 AM   Phlebitis Assessment 0 5/31/2018  8:55 AM   Infiltration Assessment 0 5/31/2018  8:55 AM   Dressing Status Clean, dry, & intact 5/31/2018  8:55 AM   Dressing Type Transparent 5/31/2018  8:55 AM   Hub Color/Line Status Flushed 5/31/2018  8:55 AM   Action Taken Open ports on tubing capped 5/31/2018  4:30 AM   Alcohol Cap Used Yes 5/31/2018  4:30 AM        Opportunity for questions and clarification was provided.

## 2018-05-31 NOTE — PROGRESS NOTES
CH offered follow up support for this pt's family, particularly pt's  as he attempted to understand why so much is happening to this family at one time. Offered active listening as pt recounted life review and his struggle to understand. Offered empathic presence for pt's 's concerns. Assured pt's  of continued prayers and affirmed ongoing availability of support. Emir Ram MDiv.  Staff   Please call [phone] to page  if needed

## 2018-05-31 NOTE — PROGRESS NOTES
Problem: Falls - Risk of  Goal: *Absence of Falls  Document Valerie Fall Risk and appropriate interventions in the flowsheet.    Outcome: Progressing Towards Goal  Fall Risk Interventions:            Medication Interventions: Bed/chair exit alarm, Patient to call before getting OOB, Teach patient to arise slowly, Evaluate medications/consider consulting pharmacy         History of Falls Interventions: Bed/chair exit alarm, Door open when patient unattended

## 2018-05-31 NOTE — PROGRESS NOTES
Spiritual Care Assessment/Progress Note  [facility]      NAME: Jeanette Miguel      MRN: [id number]  AGE: 68 y.o. SEX: female  Buddhist Affiliation: Protestant   Language: English     5/31/2018     Total Time (in minutes): 19     Spiritual Assessment begun in [address] through conversation with:         [x]Patient        [x] Family    [] Friend(s)        Reason for Consult: Crisis, Stemi     Spiritual beliefs: (Please include comment if needed)     [x] Identifies with a pilo tradition:Mormonism     [] Supported by a pilo community:      [] Claims no spiritual orientation:      [] Seeking spiritual identity:           [] Adheres to an individual form of spirituality:      [] Not able to assess:                     Identified resources for coping:      [x] Prayer                               [] Music                  [] Guided Imagery     [x] Family/friends                 [] Pet visits     [] Devotional reading                         [] Unknown     [] Other:                                              Interventions offered during this visit: (See comments for more details)    Patient Interventions: Affirmation of emotions/emotional suffering, Affirmation of pilo, Catharsis/review of pertinent events in supportive environment, Coping skills reviewed/reinforced     Family/Friend(s):  Affirmation of emotions/emotional suffering, Affirmation of pilo, Catharsis/review of pertinent events in supportive environment, Coping skills reviewed/reinforced, Life review/legacy, Prayer (actual), Prayer (assurance of)     Plan of Care:     [x] Support spiritual and/or cultural needs    [] Support AMD and/or advance care planning process      [] Support grieving process   [] Coordinate Rites and/or Rituals    [] Coordination with community clergy   [] No spiritual needs identified at this time   [] Detailed Plan of Care below (See Comments)  [] Make referral to Music Therapy  [] Make referral to Pet Therapy [] Make referral to Addiction services  [] Make referral to [facility]  [] Make referral to Spiritual Care Partner  [] No future visits requested        [x] Follow up visits as needed     Comments: Responded to Code STEMI for this pt in [facility]. Provided support for this pt and pt's multiple family gathered at bedside. Family shared of pt's medical situation, including a recent diagnosis, loss of a pet, and current stemi that was called. Family is struggling to understand why God would allow all these things to occur within one family in such a short amount of time. CH affirmed family's theological concerns and accompanied them as they spoke with MD about pt's care. Assured family of continued care. Pt and family appreciate prayers of Yazidism orientation for comfort. Affirmed ongoing availability of support. Jennifer Pritchard MDiv. Staff   Please call [phone] to page  if needed     Kathrine Flores.  Staff   Please call [phone] to page  if needed

## 2018-05-31 NOTE — PROGRESS NOTES
Patient with respiratory rate 28, SOB, and CXR changes reported to Dr. Charisse Frost and Dr. Jayden Salcedo. Per Dr. Jayden Salcedo give Louetta Sleet as planned. Patient to receive IV antibiotics. Reviewed 'Chemotherapy and you' with patient's family. Patient had received narcotic and was sleeping. They were give [facility] information sheet for [place].

## 2018-05-31 NOTE — PROGRESS NOTES
05/31/18 1422   Vital Signs   Temp 97.9 °F (36.6 °C)   Temp Source Oral   Pulse (Heart Rate) 90   Heart Rate Source Monitor   Resp Rate 22   O2 Sat (%) (!) 89 %   Level of Consciousness Alert   /61   MAP (Calculated) 74   BP 1 Method Automatic   BP 1 Location Right arm   BP Patient Position At rest   MEWS Score 3     MEWS 3, MD aware of tachypnea, will continue to monitor.

## 2018-05-31 NOTE — PROGRESS NOTES
Patient had chest pain and was noted to be diaphoretic  in the ICU and EKG showed STEMI and code STEMI was called. Patient hemodynamically stable at the present time. Dr Peter Chiang at bedside. Given patient multiple acute co morbidities,AML,severe thrombocytopenia,she was not felt to be good candidate for cardiac cath. Dr Peter Chiang discussed pros and cons and complications with patient and family. Patient will be managed medically.

## 2018-05-31 NOTE — PROGRESS NOTES
Pharmacist Note - Vancomycin Dosing    Consult provided for this 68 y.o. female for indication of HAP with specified duration of 14 days. Antibiotic regimen(s): vanc + levaquin + zosyn    Recent Labs      18   0840  18   0406  18   0120   WBC  9.7  7.3  5.5   CREA  0.68   --   0.65   BUN  27*   --   19     Frequency of BMP: daily through   Height: 175.3 cm  Weight: 79.2 kg  Est CrCl: 78 ml/min; UO: unmeasured ml/kg/hr  Temp (24hrs), Av.3 °F (36.8 °C), Min:97.8 °F (36.6 °C), Max:99.7 °F (37.6 °C)    Cultures:  none    Goal trough = 15 - 20 mcg/mL    Therapy will be initiated with a loading dose of 1750 mg IV x 1 to be followed by a maintenance dose of 1250 mg IV every 12 hours for an anticipated trough level of ~17 mcg/ml. Pharmacy to follow patient daily and order levels / make dose adjustments as appropriate.

## 2018-05-31 NOTE — PROGRESS NOTES
Day #1 of zosyn  Indication:  HAP  Current regimen:  3.375g IV q8h for 14 days    Recent Labs      18   0840  18   0406  18   0120   WBC  9.7  7.3  5.5   CREA  0.68   --   0.65   BUN  27*   --   19     Temp (24hrs), Av.3 °F (36.8 °C), Min:97.8 °F (36.6 °C), Max:99.7 °F (37.6 °C)    Est CrCl: 78.2 ml/min    Plan: Change to 4.5 g q8h for HAP indication in patient with CrCl >20 ml/min

## 2018-05-31 NOTE — PROGRESS NOTES
Hospitalist Progress Note                               Tamie Vasquez MD                                     Answering service: [phone]                               OR [phone] from in house phone                               Cell: [phone]              Date of Service:  2018  NAME:  Dilip Patton  :  1945  MRN:  [id number]      Admission Summary:   Dilip Patton is a 68 y.o. female with PMH including CAD s/p multiple stents, HTN, HLD, NIDDM2, CKD, anxiety, GERD; presenting to the ED with complaint of sharp left side chest pains. Patient reports 2-3 week history of generalized joint aches and pains, specifically knees and right clavicle. Was told by PCP it was probably arthritis. Also reporting problems with intermittent night sweats over the last several weeks, bad enough she is soaking through the sheets. The reason she came to the ED today was for 3-4 day history intermittent sharp left side chest wall pains. Described as stabbing, 10/10 intensity, worse with deep inspiration / movement / or palpation, non-exertional. Feels like she can't breathe as deep as usual because of the pain. Admits to doing some spring cleaning recently -- moving luggage around a few weeks ago and also moving lots of heavy winter clothes up to the [facility] last week. Specifically says she was carrying piles of clothing over her left arm and it was very heavy. Denies any trauma or falls. Denies any history of fevers, chills, dizziness, syncope/near-syncope, unintentional weight loss, abdominal pain, N/V/D, bleeding gums, blood in stools, swelling, or other issues. Reason for follow up:anemia and thrombocytopenia   Patient became short of breath and hypoxic. No fever. She is newly diagnosed AML due to start chemorx.      Assessment & Plan:   Pneumonia likely HAP,acute hypoxia respiratory failure  -CXR today showing worsening bilateral interstitial opacities, most confluent in the right upper lung. Differential includes edema although superimposed infection, especially in  the more confluent area within the right upper lung is possible in the appropriate clinical setting.  -She has not received a whole lot of fluid since admission  -Check,blood cx,lactate.  -Start broad spectrum antibiotics with vancomycin,zosyn,Levaquin. Nebs  -High risk for rapid deterioration. Transfer to ICU. May need BiPAP  -She has orthostatic hypotension today,so wont load her with fluid . I have stopped amlodipine and decrease dose of cozaar. Ordered IV albumin        Newly diagnosed acute myeloid leukemia,presented with anemia and thrombocytopenia with blasts in the peripheral blood   -S/p bmbx . Oncology on board. -:started on Vidaza and dexamethasone     Uncontrolled pain originating on the upper back radiating to the left axillary and infra mammary area. Appears musculoskeletal.  -No rashes or vesicles   -CTA chest,CXR and ribs xray all negative. Pain control. On flexeril    Anxiety: she takes lorazepam qhs at home. She tells me she had valium for emotional crisis when her dad . LOrazepam 0.5 mg tid prn. CAD s/p multiple stents,continue coreg,losartan,on asa,prasugrel on hold due to severe thrombocytopenia/bx procedure. .   HTN,continue home regimen  DM II with hyperglycemia: metformin on hold. Accucheks and Humalog sliding scale AC  &HS          Diet:diabetic  Code status: full  DVT prophylaxis: scd  Care Plan discussed with: patient,and family,nurse.     Discharge planning:now with hypoxic resp failure,pneumonia and being transferred to higher level of care    Hospital Problems  Date Reviewed: 2018          Codes Class Noted POA    * (Principal)AML (acute myeloid leukemia) ([facility] ICD-10-CM: C92.00  ICD-9-CM: 205.00  2018 Unknown        Leukemia ([facility] ICD-10-CM: C95.90  ICD-9-CM: 208.90  2018 Unknown                Review of Systems:   A comprehensive review of systems was negative except for that written in the HPI. Physical Examination:      Last 24hrs VS reviewed since prior progress note. Most recent are:  Visit Vitals    /61 (BP 1 Location: Left arm)    Pulse 90    Temp 97.9 °F (36.6 °C)    Resp 28    Ht 5' 9\" (1.753 m)    Wt 79.2 kg (174 lb 9.6 oz)    SpO2 91%    Breastfeeding No    BMI 25.78 kg/m2           Constitutional:  Tachypnea. Anxious.    HEENT: Head is a traumatic,  Un icteric sclera. Pink conjunctiva,no erythema or discharge. Oral mucous moist, oropharynx benign. Neck supple,    Resp:  Tachypnea bilaterally. No wheezing/rhonchi/rales. No accessory muscle use   CV:  Regular rhythm, normal rate, no murmurs, gallops, rubs    GI:  Soft, non distended, non tender. normoactive bowel sounds, no hepatosplenomegaly    :  No CVA or suprapubic tenderness   Skin  :  NO rashes,vesicles. Musculoskeletal:  No edema, warm, 2+ pulses throughout    Neurologic:  AAOx3, CN II-XII reviewed. Moves all extremities.      Psych:  Anxious       No intake or output data in the 24 hours ending 05/31/18 1525       Data Review:    Review and/or order of clinical lab test  Review and/or order of tests in the radiology section of CPT  Review and/or order of tests in the medicine section of CPT      Labs:     Recent Labs      05/31/18   0840  05/30/18   0406   WBC  9.7  7.3   HGB  9.4*  9.4*   HCT  28.7*  28.6*   PLT  21*  31*     Recent Labs      05/31/18   0840  05/29/18   0120   NA  134*  138   K  3.7  3.8   CL  101  104   CO2  22  24   BUN  27*  19   CREA  0.68  0.65   GLU  180*  158*   CA  8.6  8.5   MG   --   1.7   PHOS  3.7   --      Recent Labs      05/29/18   0120   SGOT  56*   ALT  55   AP  100   TBILI  0.7   TP  6.4   ALB  3.2*   GLOB  3.2     Recent Labs      05/31/18   0840   INR  1.3*   PTP  13.4*   APTT  27.5      Recent Labs      05/28/18   1745   TIBC  275   PSAT  32      Lab Results   Component Value Date/Time    Folate 5.1 05/28/2018 05:35 PM      No results for input(s): PH, PCO2, PO2 in the last 72 hours. No results for input(s): CPK, CKNDX, TROIQ in the last 72 hours.     No lab exists for component: CPKMB  No results found for: CHOL, CHOLX, CHLST, CHOLV, HDL, LDL, LDLC, DLDLP, TGLX, TRIGL, TRIGP, CHHD, CHHDX  Lab Results   Component Value Date/Time    Glucose (POC) 218 (H) 05/31/2018 11:25 AM    Glucose (POC) 202 (H) 05/31/2018 06:37 AM    Glucose (POC) 199 (H) 05/30/2018 09:25 PM    Glucose (POC) 185 (H) 05/30/2018 04:21 PM    Glucose (POC) 241 (H) 05/30/2018 11:04 AM     No results found for: COLOR, APPRN, SPGRU, REFSG, CARA, PROTU, GLUCU, KETU, BILU, UROU, NAGA, LEUKU, GLUKE, EPSU, BACTU, WBCU, RBCU, CASTS, UCRY      Medications Reviewed:     Current Facility-Administered Medications   Medication Dose Route Frequency    senna-docusate (PERICOLACE) 8.6-50 mg per tablet 2 Tab  2 Tab Oral DAILY    dexamethasone (DECADRON) 10 mg in dextrose 5% 50 mL IVPB  10 mg IntraVENous Q24H    granisetron (KYTRIL) injection 1 mg  1 mg IntraVENous Q24H    azaCITIDine (VIDAZA) chemo injection 95 mg +++Syringe 1 of 2; Total dose = 190 mg+++  95 mg SubCUTAneous Q24H    Followed by   Rodrigue Calero ON 6/5/2018] azaCITIDine (VIDAZA) chemo injection 95 mg +++Syringe 2 of 2; Total dose = 190 mg+++  95 mg SubCUTAneous Q24H    [START ON 6/1/2018] losartan (COZAAR) tablet 50 mg  50 mg Oral DAILY    levoFLOXacin (LEVAQUIN) 750 mg in D5W IVPB  750 mg IntraVENous Q24H    albumin human 25% (BUMINATE) solution 25 g  25 g IntraVENous Q6H    piperacillin-tazobactam (ZOSYN) 4.5 g in 0.9% sodium chloride (MBP/ADV) 100 mL  4.5 g IntraVENous Q8H    Vancomycin - pharmacy to dose   Other Rx Dosing/Monitoring    vancomycin (VANCOCIN) 1750 mg in  ml infusion  1,750 mg IntraVENous ONCE    [START ON 6/1/2018] vancomycin (VANCOCIN) 1250 mg in  ml infusion  1,250 mg IntraVENous Q12H    clonazePAM (KlonoPIN) tablet 0.5 mg  0.5 mg Oral Q8H PRN    carvedilol (COREG) tablet 3.125 mg  3.125 mg Oral BID WITH MEALS    cyclobenzaprine (FLEXERIL) tablet 10 mg  10 mg Oral TID PRN    pantoprazole (PROTONIX) tablet 40 mg  40 mg Oral DAILY    sertraline (ZOLOFT) tablet 100 mg  100 mg Oral BID    sodium chloride (NS) flush 5-10 mL  5-10 mL IntraVENous Q8H    sodium chloride (NS) flush 5-10 mL  5-10 mL IntraVENous PRN    morphine injection 2 mg  2 mg IntraVENous Q4H PRN    naloxone (NARCAN) injection 0.4 mg  0.4 mg IntraVENous PRN    diphenhydrAMINE (BENADRYL) injection 12.5 mg  12.5 mg IntraVENous Q4H PRN    ondansetron (ZOFRAN ODT) tablet 4 mg  4 mg Oral Q4H PRN    magnesium hydroxide (MILK OF MAGNESIA) 400 mg/5 mL oral suspension 30 mL  30 mL Oral DAILY PRN    glucose chewable tablet 16 g  4 Tab Oral PRN    dextrose (D50W) injection syrg 12.5-25 g  12.5-25 g IntraVENous PRN    glucagon (GLUCAGEN) injection 1 mg  1 mg IntraMUSCular PRN    insulin lispro (HUMALOG) injection   SubCUTAneous AC&HS    lidocaine (LIDODERM) 5 % patch 2 Patch  2 Patch TransDERmal Q24H    oxyCODONE IR (ROXICODONE) tablet 10 mg  10 mg Oral Q4H PRN    acetaminophen (TYLENOL) tablet 650 mg  650 mg Oral Q6H     ______________________________________________________________________  EXPECTED LENGTH OF STAY: 2d 21h  ACTUAL LENGTH OF STAY:          3                 [address]ana Billings MD

## 2018-06-01 NOTE — PROGRESS NOTES
Cardiology Progress Note  2018     Admit Date: 2018  Admit Diagnosis: Leukemia ([facility].)  CC: none currently    Assessment:   Principal Problem:    AML (acute myeloid leukemia) ([facility] (2018)    Active Problems:    Leukemia ([facility] (2018)      Plan:   Worsening pulmonary status with more SOB and generalized aching and some pleuritic pain. EKG shows less ST elevation and troponin coming down. CXR c/w RUL pneumonia and pulmonary edema. Chemo stopped and will adjust regimen to try and treat CHF component. Family looking into Hospice and now DNR. Volume status:euvolemic  Renal function: stable    For other plans, see orders.   Subjective: [facility] reports   Chest Pain:  [x]   none,  consistent with  []   non-cardiac   []   atypical   []   angina             [x]   none now    []      on-going  Dyspnea: [x]   none  []   at rest  []   with exertion     []   improved   []   unchanged   []   worsening  PND:       [x]   none  []   overnight    Orthopnea: [x]   none  []   improved  []   unchanged  []   worsening  Presyncope: [x]   none   []   improved    []   unchanged    []   worsening  Ambulated in hallway without symptoms  []   Yes  Ambulated in room without symptoms  []   Yes    Objective:    Physical Exam:  Overall VSSAF;    Visit Vitals    /70    Pulse 97    Temp 99.1 °F (37.3 °C)    Resp (!) 40    Ht 5' 9\" (1.753 m)    Wt 79.2 kg (174 lb 9.6 oz)    SpO2 93%    Breastfeeding No    BMI 25.78 kg/m2     Temp (24hrs), Av.8 °F (37.1 °C), Min:97.9 °F (36.6 °C), Max:99.8 °F (37.7 °C)    Patient Vitals for the past 8 hrs:   Pulse   18 0716 97   18 0715 96   18 0700 99   18 0645 97   18 0630 97   18 0615 98   18 0600 96   18 0545 100   18 0531 (!) 117   18 0530 (!) 114   18 0515 (!) 107   18 0500 (!) 108   18 0445 (!) 114   18 0430 (!) 102   18 0400 (!) 124   18 0345 (!) 126   18 0330 (!) 118   06/01/18 0315 (!) 121   06/01/18 0300 (!) 125   06/01/18 0245 (!) 124   06/01/18 0230 100   06/01/18 0215 (!) 124   06/01/18 0200 (!) 113   06/01/18 0145 (!) 107   06/01/18 0130 (!) 120   06/01/18 0115 (!) 128   06/01/18 0100 (!) 120    Patient Vitals for the past 8 hrs:   Resp   06/01/18 0716 (!) 40   06/01/18 0715 (!) 31   06/01/18 0700 (!) 31   06/01/18 0645 (!) 37   06/01/18 0630 (!) 41   06/01/18 0615 (!) 42   06/01/18 0600 (!) 37   06/01/18 0545 (!) 40   06/01/18 0531 (!) 31   06/01/18 0530 (!) 38   06/01/18 0515 (!) 38   06/01/18 0500 (!) 31   06/01/18 0445 (!) 34   06/01/18 0430 (!) 31   06/01/18 0400 (!) 33   06/01/18 0345 (!) 43   06/01/18 0330 (!) 34   06/01/18 0315 30   06/01/18 0300 (!) 39   06/01/18 0245 (!) 36   06/01/18 0215 (!) 34   06/01/18 0200 (!) 33   06/01/18 0145 (!) 35   06/01/18 0130 (!) 34   06/01/18 0115 (!) 32   06/01/18 0100 (!) 37    Patient Vitals for the past 8 hrs:   BP   06/01/18 0716 105/70   06/01/18 0700 108/68   06/01/18 0645 104/74   06/01/18 0630 102/69   06/01/18 0615 103/69   06/01/18 0600 107/71   06/01/18 0545 107/67   06/01/18 0531 102/64   06/01/18 0500 96/64   06/01/18 0445 99/73   06/01/18 0430 100/66   06/01/18 0415 107/66   06/01/18 0400 102/65   06/01/18 0345 104/60   06/01/18 0330 104/65   06/01/18 0315 101/57   06/01/18 0300 99/62   06/01/18 0245 104/63   06/01/18 0230 102/56   06/01/18 0215 101/68   06/01/18 0200 104/62   06/01/18 0145 96/68   06/01/18 0130 99/61   06/01/18 0115 107/64   06/01/18 0100 100/64        Intake/Output Summary (Last 24 hours) at 06/01/18 0858  Last data filed at 06/01/18 0700   Gross per 24 hour   Intake           1929.5 ml   Output             1325 ml   Net            604.5 ml       General Appearance: Moderate resp distress   Ears/Nose/Mouth/Throat:   Normal MM; anicteric. JVP: WNL   Resp:   Lungs distant with crackles. Increased resp effort. Cardiovascular:  RRR, S1, S2 normal, no new murmur. No gallop or rub. Abdomen:   Soft, non-tender, bowel sounds are present. Extremities: No edema bilaterally. Skin:  Neuro: Warm and dry. A/O x3, grossly nonfocal    []      cath site intact w/o hematoma or bruit; distal pulse unchanged. Data Review:     Telemetry independently reviewed : [x]   sinus  []   chronic afib   []   par afib  []      NSVT    ECG independently reviewed:  [x]   NSR , less prominent ST elevations  []   no new ECG provided for review  Lab results reviewed as noted below. Current medications reviewed as noted below. No results for input(s): PH, PCO2, PO2 in the last 72 hours. Recent Labs      06/01/18   0512 05/31/18   1831   CPK  120  218*   CKMB  7.1*  19.8*   TROIQ  3.16*  6.54*     Recent Labs      06/01/18   0512  05/31/18   2332  05/31/18   1915  05/31/18   0840   NA  139   --   134*  134*   K  3.3*   --   3.9  3.7   CL  101   --   99  101   CO2  23   --   23  22   BUN  40*   --   32*  27*   CREA  0.97   --   0.64  0.68   GLU  202*   --   189*  180*   PHOS   --    --    --   3.7   CA  8.2*   --   8.4*  8.6   WBC  9.9  10.3  10.2  9.7   HGB  7.9*  8.3*  9.1*  9.4*   HCT  24.3*  25.5*  27.8*  28.7*   PLT  36*  53*  21*  21*     No results for input(s): SGOT, GPT, ALT, AP, TBIL, TBILI, TP, ALB, GLOB, GGT, AML, LPSE in the last 72 hours. No lab exists for component: AMYP, HLPSE  Recent Labs      05/31/18   0840   INR  1.3*   PTP  13.4*   APTT  27.5      No results for input(s): FE, TIBC, PSAT, FERR in the last 72 hours.    Lab Results   Component Value Date/Time    Glucose (POC) 210 (H) 06/01/2018 08:09 AM    Glucose (POC) 211 (H) 05/31/2018 10:12 PM    Glucose (POC) 219 (H) 05/31/2018 04:57 PM    Glucose (POC) 218 (H) 05/31/2018 11:25 AM    Glucose (POC) 202 (H) 05/31/2018 06:37 AM       Current Facility-Administered Medications   Medication Dose Route Frequency    potassium chloride 10 mEq in 100 ml IVPB  10 mEq IntraVENous Q1H    furosemide (LASIX) injection 80 mg  80 mg IntraVENous Q12H  DOBUTamine (DOBUTREX) 500 mg/250 mL (2,000 mcg/mL) infusion  5 mcg/kg/min IntraVENous CONTINUOUS    senna-docusate (PERICOLACE) 8.6-50 mg per tablet 2 Tab  2 Tab Oral DAILY    dexamethasone (DECADRON) 10 mg in dextrose 5% 50 mL IVPB  10 mg IntraVENous Q24H    granisetron (KYTRIL) injection 1 mg  1 mg IntraVENous Q24H    azaCITIDine (VIDAZA) chemo injection 95 mg +++Syringe 1 of 2; Total dose = 190 mg+++  95 mg SubCUTAneous Q24H    Followed by   Sophy Mix ON 6/5/2018] azaCITIDine (VIDAZA) chemo injection 95 mg +++Syringe 2 of 2; Total dose = 190 mg+++  95 mg SubCUTAneous Q24H    levoFLOXacin (LEVAQUIN) 750 mg in D5W IVPB  750 mg IntraVENous Q24H    piperacillin-tazobactam (ZOSYN) 4.5 g in 0.9% sodium chloride (MBP/ADV) 100 mL  4.5 g IntraVENous Q8H    Vancomycin - pharmacy to dose   Other Rx Dosing/Monitoring    vancomycin (VANCOCIN) 1250 mg in  ml infusion  1,250 mg IntraVENous Q12H    albuterol-ipratropium (DUO-NEB) 2.5 MG-0.5 MG/3 ML  3 mL Nebulization Q6H PRN    albumin human 25% (BUMINATE) solution 25 g  25 g IntraVENous Q6H    0.9% sodium chloride infusion 250 mL  250 mL IntraVENous PRN    clonazePAM (KlonoPIN) tablet 0.5 mg  0.5 mg Oral Q8H PRN    cyclobenzaprine (FLEXERIL) tablet 10 mg  10 mg Oral TID PRN    pantoprazole (PROTONIX) tablet 40 mg  40 mg Oral DAILY    sertraline (ZOLOFT) tablet 100 mg  100 mg Oral BID    sodium chloride (NS) flush 5-10 mL  5-10 mL IntraVENous Q8H    sodium chloride (NS) flush 5-10 mL  5-10 mL IntraVENous PRN    morphine injection 2 mg  2 mg IntraVENous Q4H PRN    naloxone (NARCAN) injection 0.4 mg  0.4 mg IntraVENous PRN    diphenhydrAMINE (BENADRYL) injection 12.5 mg  12.5 mg IntraVENous Q4H PRN    ondansetron (ZOFRAN ODT) tablet 4 mg  4 mg Oral Q4H PRN    magnesium hydroxide (MILK OF MAGNESIA) 400 mg/5 mL oral suspension 30 mL  30 mL Oral DAILY PRN    glucose chewable tablet 16 g  4 Tab Oral PRN    dextrose (D50W) injection syrg 12.5-25 g 12.5-25 g IntraVENous PRN    glucagon (GLUCAGEN) injection 1 mg  1 mg IntraMUSCular PRN    insulin lispro (HUMALOG) injection   SubCUTAneous AC&HS    lidocaine (LIDODERM) 5 % patch 2 Patch  2 Patch TransDERmal Q24H    oxyCODONE IR (ROXICODONE) tablet 10 mg  10 mg Oral Q4H PRN    acetaminophen (TYLENOL) tablet 650 mg  650 mg Oral Q6H        Roxana Erazo MD

## 2018-06-01 NOTE — PROGRESS NOTES
PCCM    67 yo new dx of AML and was started on chemo now with increased shortness of breath and resp distress. Has ruled in for MI. Not a candidate of revascularization due to thrombocytopenia and is on med management. Troponin peak at 6. CXR with edema that appears improved but now with more consolidation in RUL suggestive of pneumonia. On broad abx.     Echo being done  Plan per cardiology for additional diuresis and dobutamine    Pt is now a DNR and have d/w family and if she deteriorates despite medical management would be receptive to comfort care    She received a low dose of ativan that has helped with her anxiety and she appears more comfortable per family and RN    She is on bipap for resp support with 100% FiO2 her sats are 89% and RR 40    She is to have a picc line placed and will start precedex if needed for anxiety    Past Medical History:   Diagnosis Date    CAD (coronary artery disease)     Cancer ([address]     basal cell cancer face arm and chest    Chronic kidney disease     kidney stones    Diabetes ([address]     Hypertension     Other ill-defined conditions(799.89)     hx of kidney stones    Other ill-defined conditions(799.89)     elevated cholesterol    Psychiatric disorder     anxiety    Stroke ([address]     Unspecified adverse effect of anesthesia     ? novacaine rapid heart beat     Past Surgical History:   Procedure Laterality Date    ABDOMEN SURGERY PROC UNLISTED      COLONOSCOPY N/A 9/15/2016    COLONOSCOPY performed by Rosemarie Smith MD [facility]    COLONOSCOPY,DIAGNOSTIC  9/15/2016         HX CHOLECYSTECTOMY      HX HEART CATHETERIZATION      x2 cardiac stents    HX HEENT      x4 sinus surgeries    HX HEENT      cataracts and implants    HX ORTHOPAEDIC      broke back - \"cement\" in back    HX OTHER SURGICAL      skin cancer removed face arms neck    HX OTHER SURGICAL      hemorrhoidectomy and fissure repair    HX OTHER SURGICAL      colonoscopy    HX OTHER SURGICAL gall bladder removed    HX TONSILLECTOMY      WA COLONOSCOPY FLX DX W/COLLJ SPEC WHEN PFRMD  2013          Social History     Social History    Marital status:      Spouse name: N/A    Number of children: N/A    Years of education: N/A     Occupational History    Not on file.      Social History Main Topics    Smoking status: Never Smoker    Smokeless tobacco: Never Used    Alcohol use No    Drug use: No    Sexual activity: Not on file     Other Topics Concern    Not on file     Social History Narrative     Family History   Problem Relation Age of Onset    Cancer Father      colon cancer    Cancer Mother      lymphoma       ROS - unable to obtain due to mental status and resp distress    Patient Vitals for the past 4 hrs:   BP Pulse Resp SpO2   18 0716 105/70 97 (!) 40 93 %   18 0715 - 96 (!) 31 94 %   18 0700 108/68 99 (!) 31 93 %   18 0645 104/74 97 (!) 37 93 %   18 0630 102/69 97 (!) 41 94 %   18 0615 103/69 98 (!) 42 94 %   Temp (24hrs), Av.8 °F (37.1 °C), Min:97.9 °F (36.6 °C), Max:99.8 °F (37.7 °C)    Intake/Output Summary (Last 24 hours) at 18 1005  Last data filed at 18 0700   Gross per 24 hour   Intake           1689.5 ml   Output             1325 ml   Net            364.5 ml     tachypneic on bipap  Mmm  Mild accessory use  Crackles  RRR  Soft nt  Warm and dry  No edema    CXR viewed - decreased edema but increased RUL atx/asdz    Lab:  Recent Labs      18   0512  18   2332  18   1915  18   1831  18   1538  18   0840   WBC  9.9  10.3  10.2   --    --   9.7   HGB  7.9*  8.3*  9.1*   --    --   9.4*   PLT  36*  53*  21*   --    --   21*   NA  139   --   134*   --    --   134*   K  3.3*   --   3.9   --    --   3.7   CL  101   --   99   --    --   101   CO2  23   --   23   --    --   22   BUN  40*   --   32*   --    --   27*   CREA  0.97   --   0.64   --    --   0.68   GLU  202*   --   189*   -- --   180*   CA  8.2*   --   8.4*   --    --   8.6   MG  1.7   --    --    --    --    --    PHOS   --    --    --    --    --   3.7   INR   --    --    --    --    --   1.3*   TROIQ  3.16*   --    --   6.54*   --    --    CPK  120   --    --   218*   --    --    LAC   --   1.9   --    --   2.1*   --      ABG:  Recent Labs      06/01/18   0322   PHI  7.470*   PCO2I  32.7*   PO2I  55*   HCO3I  23.8   SO2I  90*   FIO2I  0.50     All Micro Results     Procedure Component Value Units Date/Time    CULTURE, BLOOD [[id number]] Collected:  05/31/18 1538    Order Status:  Completed Specimen:  Whole Blood from Blood Updated:  06/01/18 0720     Special Requests: NO SPECIAL REQUESTS        Culture result: NO GROWTH AFTER 15 HOURS           Impression  Acute hypoxemic resp failure - 2/2 pneumonia and pulm edema    STEMI - not an intervention candidate on med management - troponin trending down    New dx of AML    Thrombocytopenia    Anxiety    DM    DNR    Plan  --bipap / high flow  --abx empirically and follow up cultures  --MI /CHF per cardiology  --precedex if needed for anxiety  --cardiology and oncology following  --considering comfort measures    Anil Barrientos MD

## 2018-06-01 NOTE — PROGRESS NOTES
Hematology-Oncology Progress Note    Natali Higuera  1945  [id number]  6/1/2018    Follow-up for:  AML     [x]        Chart notes since last visit reviewed   [x]        Medications reviewed for allergies and interactions       Case discussed with the following:         []                            []        Nursing Staff                                                                         []        Pathologist                                                                        [x]        FAMILY      Subjective:     Spoke with patient who complains of: pt. Is anxious, uncomfortable with BIPAP , no c/o chest pain,     Objective:   Patient Vitals for the past 24 hrs:   BP Temp Pulse Resp SpO2 Weight   06/01/18 0716 105/70 - 97 (!) 40 93 % -   06/01/18 0715 - - 96 (!) 31 94 % -   06/01/18 0700 108/68 - 99 (!) 31 93 % -   06/01/18 0645 104/74 - 97 (!) 37 93 % -   06/01/18 0630 102/69 - 97 (!) 41 94 % -   06/01/18 0615 103/69 - 98 (!) 42 94 % -   06/01/18 0600 107/71 - 96 (!) 37 95 % -   06/01/18 0545 107/67 - 100 (!) 40 94 % -   06/01/18 0531 102/64 - (!) 117 (!) 31 94 % -   06/01/18 0530 - - (!) 114 (!) 38 93 % -   06/01/18 0515 - - (!) 107 (!) 38 94 % -   06/01/18 0500 96/64 - (!) 108 (!) 31 92 % -   06/01/18 0445 99/73 - (!) 114 (!) 34 94 % -   06/01/18 0430 100/66 - (!) 102 (!) 31 93 % -   06/01/18 0415 107/66 - - - - -   06/01/18 0400 102/65 99.1 °F (37.3 °C) (!) 124 (!) 33 (!) 78 % -   06/01/18 0345 104/60 - (!) 126 (!) 43 95 % -   06/01/18 0330 104/65 - (!) 118 (!) 34 96 % -   06/01/18 0315 101/57 - (!) 121 30 (!) 86 % -   06/01/18 0300 99/62 - (!) 125 (!) 39 (!) 89 % -   06/01/18 0245 104/63 - (!) 124 (!) 36 (!) 87 % -   06/01/18 0230 102/56 - 100 - 90 % -   06/01/18 0215 101/68 - (!) 124 (!) 34 (!) 89 % -   06/01/18 0200 104/62 - (!) 113 (!) 33 (!) 88 % -   06/01/18 0145 96/68 - (!) 107 (!) 35 90 % -   06/01/18 0130 99/61 - (!) 120 (!) 34 91 % -   06/01/18 0115 107/64 - (!) 128 (!) 32 91 % - 06/01/18 0100 100/64 - (!) 120 (!) 37 91 % -   06/01/18 0045 98/73 - (!) 109 28 91 % -   06/01/18 0030 97/57 - (!) 114 (!) 33 90 % -   06/01/18 0015 97/73 - (!) 118 (!) 37 91 % -   06/01/18 0000 100/70 99.4 °F (37.4 °C) (!) 112 (!) 31 91 % -   05/31/18 2345 103/63 - (!) 112 (!) 34 91 % -   05/31/18 2330 104/70 99.1 °F (37.3 °C) (!) 126 30 92 % -   05/31/18 2315 95/62 - (!) 120 25 90 % -   05/31/18 2245 105/60 - (!) 109 (!) 36 (!) 89 % -   05/31/18 2230 98/68 99 °F (37.2 °C) (!) 103 26 90 % -   05/31/18 2215 112/63 99.1 °F (37.3 °C) 97 (!) 38 92 % -   05/31/18 2200 100/77 98.2 °F (36.8 °C) 98 27 (!) 83 % -   05/31/18 2150 103/63 99.8 °F (37.7 °C) 97 22 90 % -   05/31/18 2145 109/62 - 96 29 (!) 89 % -   05/31/18 2130 113/63 98.9 °F (37.2 °C) 98 (!) 34 (!) 87 % -   05/31/18 2115 114/72 99.6 °F (37.6 °C) 96 (!) 36 91 % -   05/31/18 2100 114/69 99.6 °F (37.6 °C) 99 20 95 % -   05/31/18 2030 113/67 98.8 °F (37.1 °C) 99 (!) 31 92 % -   05/31/18 2015 112/65 - 97 (!) 38 92 % -   05/31/18 2000 106/77 99.5 °F (37.5 °C) 97 29 93 % -   05/31/18 1945 109/77 98.7 °F (37.1 °C) 99 (!) 40 (!) 89 % -   05/31/18 1930 (!) 101/39 98.4 °F (36.9 °C) 97 (!) 37 (!) 89 % -   05/31/18 1917 108/67 98.5 °F (36.9 °C) 97 30 90 % -   05/31/18 1900 109/71 - 96 (!) 31 91 % -   05/31/18 1800 - - (!) 101 (!) 32 (!) 88 % -   05/31/18 1745 - - - - 90 % -   05/31/18 1730 119/76 98 °F (36.7 °C) 96 21 (!) 89 % -   05/31/18 1456 105/61 97.9 °F (36.6 °C) 90 28 91 % -   05/31/18 1422 100/61 97.9 °F (36.6 °C) 90 22 (!) 89 % -   05/31/18 1333 102/63 - - - - -   05/31/18 1255 95/64 98.2 °F (36.8 °C) 89 24 95 % -   05/31/18 1249 - - - - - 79.2 kg (174 lb 9.6 oz)   05/31/18 1100 - - - 28 - -   05/31/18 0842 - - - - 95 % -   05/31/18 0833 135/69 98 °F (36.7 °C) 91 17 (!) 88 % -       REVIEW OF SYSTEMS:    Constitutional: negative fever, negative chills, negative weight loss  Eyes:   negative visual changes  ENT:   negative sore throat, tongue or lip swelling  Respiratory:  negative cough, negative dyspnea  Cards:  negative for chest pain, palpitations, lower extremity edema  GI:   negative for nausea, vomiting, diarrhea, and abdominal pain  Neuro:  negative for headaches, dizziness, vertigo  []                        Full ROS o/w normal/non contributor    Constitutional:  Patient looks  [x]        Sick  [x]        Frail  []        Better                                                 []        Depressed    HEENT:  [x]   NC                         []   AT               []    ALOPECIA           Eyes: [x]   Normal               []    Icteric  Oropharynx: [x]    Normal               mask in place  Mucositis: []    None                 Grade: []        I  []        II  []        III  []        IV  Neck:   [x]   Supple                  []  Rigid               JVD:    [x]   ABSENT       []   PRESENT  Lymphadenopathy:   [x]   None Noted            []   PRESENT    Chest:  [x]   Clear               []    Rhonchi                      Dec'd @     []  Right Base           []   Left Base    CV:             [x]   Regular              []  Irregular               []   Tachy                []   Murmur  Abdominal:   [x]    Soft              []   NON-tender               []   Tender      BS:    []   ABSENT                   []   PRESENT  Liver:     [x]  NON-palp                  []   EDGE- palp  Spleen: [x]   NON-palp                   []  EDGE - palp  Mass:   [x]   ABSENT                          []  PRESENT  Extr:    [x]  Lymphedema             []   Cyanosis      []  Clubbing  Edema:     [x]   NONE       []   PRESENT  Skin:  Intact [x]           Purpura []        Rash: [x]   ABSENT       []  PRESENT  Neuro:  [x]        Normal  []        Confused      Available labs reviewed:  Labs:    Recent Results (from the past 24 hour(s))   CBC WITH AUTOMATED DIFF    Collection Time: 05/31/18  8:40 AM   Result Value Ref Range    WBC 9.7 3.6 - 11.0 K/uL    RBC 3.18 (L) 3.80 - 5.20 M/uL HGB 9.4 (L) 11.5 - 16.0 g/dL    HCT 28.7 (L) 35.0 - 47.0 %    MCV 90.3 80.0 - 99.0 FL    MCH 29.6 26.0 - 34.0 PG    MCHC 32.8 30.0 - 36.5 g/dL    RDW 17.2 (H) 11.5 - 14.5 %    PLATELET 21 (LL) 681 - 400 K/uL    MPV 12.4 8.9 - 12.9 FL    NRBC 0.2 (H) 0  WBC    ABSOLUTE NRBC 0.02 (H) 0.00 - 0.01 K/uL    NEUTROPHILS 8 (L) 32 - 75 %    BAND NEUTROPHILS 2 0 - 6 %    LYMPHOCYTES 14 12 - 49 %    MONOCYTES 56 (H) 5 - 13 %    EOSINOPHILS 0 0 - 7 %    BASOPHILS 1 0 - 1 %    MYELOCYTES 2 (H) 0 %    PROMYELOCYTES 3 (H) 0 %    BLASTS 14 (H) 0 %    IMMATURE GRANULOCYTES 0 %    ABS. NEUTROPHILS 1.0 (L) 1.8 - 8.0 K/UL    ABS. LYMPHOCYTES 1.4 0.8 - 3.5 K/UL    ABS. MONOCYTES 5.4 (H) 0.0 - 1.0 K/UL    ABS. EOSINOPHILS 0.0 0.0 - 0.4 K/UL    ABS. BASOPHILS 0.1 0.0 - 0.1 K/UL    ABS. IMM.  GRANS. 0.0 K/UL    DF MANUAL      RBC COMMENTS ANISOCYTOSIS  1+        RBC COMMENTS OVALOCYTES  PRESENT        RBC COMMENTS TEARDROP CELLS  PRESENT       METABOLIC PANEL, BASIC    Collection Time: 05/31/18  8:40 AM   Result Value Ref Range    Sodium 134 (L) 136 - 145 mmol/L    Potassium 3.7 3.5 - 5.1 mmol/L    Chloride 101 97 - 108 mmol/L    CO2 22 21 - 32 mmol/L    Anion gap 11 5 - 15 mmol/L    Glucose 180 (H) 65 - 100 mg/dL    BUN 27 (H) 6 - 20 MG/DL    Creatinine 0.68 0.55 - 1.02 MG/DL    BUN/Creatinine ratio 40 (H) 12 - 20      GFR est AA >60 >60 ml/min/1.73m2    GFR est non-AA >60 >60 ml/min/1.73m2    Calcium 8.6 8.5 - 10.1 MG/DL   FIBRINOGEN    Collection Time: 05/31/18  8:40 AM   Result Value Ref Range    Fibrinogen 675 (H) 200 - 475 mg/dL   PROTHROMBIN TIME + INR    Collection Time: 05/31/18  8:40 AM   Result Value Ref Range    INR 1.3 (H) 0.9 - 1.1      Prothrombin time 13.4 (H) 9.0 - 11.1 sec   PTT    Collection Time: 05/31/18  8:40 AM   Result Value Ref Range    aPTT 27.5 22.1 - 32.0 sec    aPTT, therapeutic range     58.0 - 77.0 SECS   PHOSPHORUS    Collection Time: 05/31/18  8:40 AM   Result Value Ref Range    Phosphorus 3.7 2.6 - 4.7 MG/DL   GLUCOSE, POC    Collection Time: 05/31/18 11:25 AM   Result Value Ref Range    Glucose (POC) 218 (H) 65 - 100 mg/dL    Performed by José Cali    CULTURE, BLOOD    Collection Time: 05/31/18  3:38 PM   Result Value Ref Range    Special Requests: NO SPECIAL REQUESTS      Culture result: NO GROWTH AFTER 15 HOURS     LACTIC ACID    Collection Time: 05/31/18  3:38 PM   Result Value Ref Range    Lactic acid 2.1 (HH) 0.4 - 2.0 MMOL/L   GLUCOSE, POC    Collection Time: 05/31/18  4:57 PM   Result Value Ref Range    Glucose (POC) 219 (H) 65 - 100 mg/dL    Performed by [facility]    ECG RHYTHM ANALYSIS ADULT    Collection Time: 05/31/18  6:20 PM   Result Value Ref Range    Ventricular Rate 99 BPM    Atrial Rate 99 BPM    P-R Interval 182 ms    QRS Duration 86 ms    Q-T Interval 340 ms    QTC Calculation (Bezet) 436 ms    Calculated P Axis 37 degrees    Calculated R Axis 0 degrees    Calculated T Axis 3 degrees    Diagnosis       Normal sinus rhythm  Possible Left atrial enlargement  ST elevation, consider inferolateral injury or acute infarct  ** ACUTE MI **  When compared with ECG of 28-MAY-2018 01:35,  ST elevation now present in Lateral leads     TROPONIN I    Collection Time: 05/31/18  6:31 PM   Result Value Ref Range    Troponin-I, Qt. 6.54 (H) <0.05 ng/mL   CK W/ CKMB & INDEX    Collection Time: 05/31/18  6:31 PM   Result Value Ref Range     (H) 26 - 192 U/L    CK - MB 19.8 (H) <3.6 NG/ML    CK-MB Index 9.1 (H) 0 - 2.5     TYPE & SCREEN    Collection Time: 05/31/18  6:44 PM   Result Value Ref Range    Crossmatch Expiration 06/03/2018     ABO/Rh(D) O POSITIVE     Antibody screen NEG    PLATELETS, ALLOCATE    Collection Time: 05/31/18  6:45 PM   Result Value Ref Range    Unit number [id number]     Blood component type [facility] LR,PAS     Unit division 00     Status of unit TRANSFUSED     Unit number [id number]     Blood component type [facility] LR,PAS1     Unit division 00     Status of unit TRANSFUSED    METABOLIC PANEL, BASIC    Collection Time: 05/31/18  7:15 PM   Result Value Ref Range    Sodium 134 (L) 136 - 145 mmol/L    Potassium 3.9 3.5 - 5.1 mmol/L    Chloride 99 97 - 108 mmol/L    CO2 23 21 - 32 mmol/L    Anion gap 12 5 - 15 mmol/L    Glucose 189 (H) 65 - 100 mg/dL    BUN 32 (H) 6 - 20 MG/DL    Creatinine 0.64 0.55 - 1.02 MG/DL    BUN/Creatinine ratio 50 (H) 12 - 20      GFR est AA >60 >60 ml/min/1.73m2    GFR est non-AA >60 >60 ml/min/1.73m2    Calcium 8.4 (L) 8.5 - 10.1 MG/DL   CBC WITH AUTOMATED DIFF    Collection Time: 05/31/18  7:15 PM   Result Value Ref Range    WBC 10.2 3.6 - 11.0 K/uL    RBC 3.10 (L) 3.80 - 5.20 M/uL    HGB 9.1 (L) 11.5 - 16.0 g/dL    HCT 27.8 (L) 35.0 - 47.0 %    MCV 89.7 80.0 - 99.0 FL    MCH 29.4 26.0 - 34.0 PG    MCHC 32.7 30.0 - 36.5 g/dL    RDW 17.6 (H) 11.5 - 14.5 %    PLATELET 21 (LL) 181 - 400 K/uL    MPV 12.1 8.9 - 12.9 FL    NRBC 0.2 (H) 0  WBC    ABSOLUTE NRBC 0.02 (H) 0.00 - 0.01 K/uL    NEUTROPHILS 6 (L) 32 - 75 %    BAND NEUTROPHILS 1 0 - 6 %    LYMPHOCYTES 14 12 - 49 %    MONOCYTES 73 (H) 5 - 13 %    EOSINOPHILS 0 0 - 7 %    BASOPHILS 0 0 - 1 %    PROMYELOCYTES 3 (H) 0 %    BLASTS 3 (H) 0 %    IMMATURE GRANULOCYTES 0 %    ABS. NEUTROPHILS 0.7 (L) 1.8 - 8.0 K/UL    ABS. LYMPHOCYTES 1.4 0.8 - 3.5 K/UL    ABS. MONOCYTES 7.4 (H) 0.0 - 1.0 K/UL    ABS. EOSINOPHILS 0.0 0.0 - 0.4 K/UL    ABS. BASOPHILS 0.0 0.0 - 0.1 K/UL    ABS. IMM.  GRANS. 0.0 K/UL    DF MANUAL      RBC COMMENTS ANISOCYTOSIS  1+        RBC COMMENTS POLYCHROMASIA  1+        RBC COMMENTS OVALOCYTES  PRESENT       GLUCOSE, POC    Collection Time: 05/31/18 10:12 PM   Result Value Ref Range    Glucose (POC) 211 (H) 65 - 100 mg/dL    Performed by Giovanni Morales    LACTIC ACID    Collection Time: 05/31/18 11:32 PM   Result Value Ref Range    Lactic acid 1.9 0.4 - 2.0 MMOL/L   CBC WITH AUTOMATED DIFF    Collection Time: 05/31/18 11:32 PM   Result Value Ref Range    WBC 10.3 3.6 - 11.0 K/uL    RBC 2.83 (L) 3.80 - 5.20 M/uL    HGB 8.3 (L) 11.5 - 16.0 g/dL    HCT 25.5 (L) 35.0 - 47.0 %    MCV 90.1 80.0 - 99.0 FL    MCH 29.3 26.0 - 34.0 PG    MCHC 32.5 30.0 - 36.5 g/dL    RDW 17.6 (H) 11.5 - 14.5 %    PLATELET 53 (L) 185 - 400 K/uL    MPV 11.1 8.9 - 12.9 FL    NRBC 0.4 (H) 0  WBC    ABSOLUTE NRBC 0.04 (H) 0.00 - 0.01 K/uL    NEUTROPHILS 9 (L) 32 - 75 %    BAND NEUTROPHILS 2 0 - 6 %    LYMPHOCYTES 18 12 - 49 %    MONOCYTES 50 (H) 5 - 13 %    EOSINOPHILS 0 0 - 7 %    BASOPHILS 0 0 - 1 %    MYELOCYTES 1 (H) 0 %    PROMYELOCYTES 3 (H) 0 %    BLASTS 17 (H) 0 %    IMMATURE GRANULOCYTES 0 %    ABS. NEUTROPHILS 1.1 (L) 1.8 - 8.0 K/UL    ABS. LYMPHOCYTES 1.9 0.8 - 3.5 K/UL    ABS. MONOCYTES 5.2 (H) 0.0 - 1.0 K/UL    ABS. EOSINOPHILS 0.0 0.0 - 0.4 K/UL    ABS. BASOPHILS 0.0 0.0 - 0.1 K/UL    ABS. IMM. GRANS. 0.0 K/UL    DF MANUAL      RBC COMMENTS ANISOCYTOSIS  1+        RBC COMMENTS OVALOCYTES  PRESENT       POC G3 - PUL    Collection Time: 06/01/18  3:22 AM   Result Value Ref Range    FIO2 (POC) 0.50 %    pH (POC) 7.470 (H) 7.35 - 7.45      pCO2 (POC) 32.7 (L) 35.0 - 45.0 MMHG    pO2 (POC) 55 (L) 80 - 100 MMHG    HCO3 (POC) 23.8 22 - 26 MMOL/L    sO2 (POC) 90 (L) 92 - 97 %    Base excess (POC) 0 mmol/L    Site RIGHT RADIAL      Device: BIPAP      PEEP/CPAP (POC) 5 cmH2O    Allens test (POC) YES      Specimen type (POC) ARTERIAL      Total resp.  rate 22     CBC WITH AUTOMATED DIFF    Collection Time: 06/01/18  5:12 AM   Result Value Ref Range    WBC 9.9 3.6 - 11.0 K/uL    RBC 2.65 (L) 3.80 - 5.20 M/uL    HGB 7.9 (L) 11.5 - 16.0 g/dL    HCT 24.3 (L) 35.0 - 47.0 %    MCV 91.7 80.0 - 99.0 FL    MCH 29.8 26.0 - 34.0 PG    MCHC 32.5 30.0 - 36.5 g/dL    RDW 17.9 (H) 11.5 - 14.5 %    PLATELET 36 (LL) 581 - 400 K/uL    MPV 10.9 8.9 - 12.9 FL    NRBC 0.2 (H) 0  WBC    ABSOLUTE NRBC 0.02 (H) 0.00 - 0.01 K/uL    NEUTROPHILS 7 (L) 32 - 75 %    LYMPHOCYTES 13 12 - 49 %    MONOCYTES 59 (H) 5 - 13 %    EOSINOPHILS 0 0 - 7 %    BASOPHILS 0 0 - 1 %    METAMYELOCYTES 1 (H) 0 %    MYELOCYTES 1 (H) 0 %    PROMYELOCYTES 7 (H) 0 %    BLASTS 12 (H) 0 %    IMMATURE GRANULOCYTES 0 %    ABS. NEUTROPHILS 0.7 (L) 1.8 - 8.0 K/UL    ABS. LYMPHOCYTES 1.3 0.8 - 3.5 K/UL    ABS. MONOCYTES 5.8 (H) 0.0 - 1.0 K/UL    ABS. EOSINOPHILS 0.0 0.0 - 0.4 K/UL    ABS. BASOPHILS 0.0 0.0 - 0.1 K/UL    ABS. IMM.  GRANS. 0.0 K/UL    DF MANUAL      RBC COMMENTS ANISOCYTOSIS  1+        RBC COMMENTS OVALOCYTES  PRESENT       CK W/ CKMB & INDEX    Collection Time: 06/01/18  5:12 AM   Result Value Ref Range     26 - 192 U/L    CK - MB 7.1 (H) <3.6 NG/ML    CK-MB Index 5.9 (H) 0 - 2.5     METABOLIC PANEL, BASIC    Collection Time: 06/01/18  5:12 AM   Result Value Ref Range    Sodium 139 136 - 145 mmol/L    Potassium 3.3 (L) 3.5 - 5.1 mmol/L    Chloride 101 97 - 108 mmol/L    CO2 23 21 - 32 mmol/L    Anion gap 15 5 - 15 mmol/L    Glucose 202 (H) 65 - 100 mg/dL    BUN 40 (H) 6 - 20 MG/DL    Creatinine 0.97 0.55 - 1.02 MG/DL    BUN/Creatinine ratio 41 (H) 12 - 20      GFR est AA >60 >60 ml/min/1.73m2    GFR est non-AA 56 (L) >60 ml/min/1.73m2    Calcium 8.2 (L) 8.5 - 10.1 MG/DL   TROPONIN I    Collection Time: 06/01/18  5:12 AM   Result Value Ref Range    Troponin-I, Qt. 3.16 (H) <0.05 ng/mL   TSH 3RD GENERATION    Collection Time: 06/01/18  5:12 AM   Result Value Ref Range    TSH 0.48 0.36 - 3.74 uIU/mL   T4, FREE    Collection Time: 06/01/18  5:12 AM   Result Value Ref Range    T4, Free 1.3 0.8 - 1.5 NG/DL   MAGNESIUM    Collection Time: 06/01/18  5:12 AM   Result Value Ref Range    Magnesium 1.7 1.6 - 2.4 mg/dL   EKG, 12 LEAD, INITIAL    Collection Time: 06/01/18  5:50 AM   Result Value Ref Range    Ventricular Rate 98 BPM    Atrial Rate 98 BPM    P-R Interval 192 ms    QRS Duration 78 ms    Q-T Interval 348 ms    QTC Calculation (Bezet) 444 ms    Calculated P Axis 40 degrees    Calculated R Axis 0 degrees    Calculated T Axis 17 degrees    Diagnosis       Normal sinus rhythm  ST elevation, consider inferolateral injury or acute infarct  ** ACUTE MI **  When compared with ECG of 31-MAY-2018 18:20,  No significant change was found         Available Xrays reviewed:    Chemotherapy monitored and toxicities assessed:    Assessment and Plan   1. AML. .. New diagnosis. .. Pt. Started VIDAZA x 5 doses yesterday,,, this is associated with a 50 % response rate and survival of 9-11 months. 2. NSTMI. .. New diagnosis last night,,, agree that she is medically unstable for cath procedure, defer medical management to cardiology  3. Pneumonia. .. New diagnosis yesterday,, agree with current antibiotics. .. F/u cultures, cxr  4. Anemia/thrombocytopenia. . Secondary to #1. .. Pt. Was given platelets yesterday for count of 21k when cardiac cath was being considered, the count went to 51k but dropped to 36k today. .. Would hold off on further platelets unless there is bleeding or count drops below 20k. .. Would transfuse blood prn hgb < 8 due to recent MI.  5. Disposition. .. Pt. Is critically ill with high risk for decompensation. D/w  today regarding the poor prognosis. Onofre Capps If the family elects to go towards a comfort measure approach that would also be appropriate, he will d/w his children.   6. Ramin Luna MD

## 2018-06-01 NOTE — PROGRESS NOTES
Responded to request for  support for dying patient's family. Offered words of consolation and affirmation to family. [facility]'s Staff  (Gautam Adame Patient Care Specialist)   Paging Service [phone] [phone]

## 2018-06-01 NOTE — ROUTINE PROCESS
0800 Report received. Assessment complete. Patient very anxious,  at bedside. Patient on bi-pap 100% oxygen. 0830  wants patient to have some ice or a sip of drink. When told she needed the bi-pap mask or her oxygen drops he states Tigist Morfin is going to die anyway, she needs something to drink\". Dr. Mariah Claudio was in and told the  that the patient was going to die and they needed to make some decisions.  very distressed. Daughter states no one was supposed to talk to the  unless the son or daughter was present. She says this should have been noted in the chart but was not passed along to nursing staff. Dr. Mariah Claudio did not speak to nursing staff.  0900 Dr. Irais العلي and Dr. Jimena Briones in to see. Lots of family present. Hospice nurse who is also a family member is present. PICC team at bedside because patient needs IV access. Discussions vacillating between Hospice care and  continuing treatment. 0930 Patient very anxious. One time lorazepam order received from Dr. Saran Gregorio for PICC line insertion. 0930 Lorazepam 0.5 mg IV given. Bedside echocardiogram. RR 40, oxygen saturation 91%. 0945 Attempt high flow for comfort and mouth care. Oxygen saturations dropped into the 70s. Bi-pap reapplied. 1000 Dr. Saran Gregorio in to see. Spoke to family. 1030 Precedex started. PICC line at bedside. 1035 Precedex increased. Very anxious. O3 saturation 87%, RR 48.  1050 PICC line still in process. Precedex up to 0.7 mcg. Restless, anxious. 1 Family decided on DNR status. 1100 PICC line in and ready to use. The plan is to try Dobutamine and diuresing and see if she improves. 1115 Dobutamine started. 1130 Morphine and lorazepam orders received from Dr. Nena Adrian. He is here. 1158 Medicated with lorazepam 1 mg and Morphine 2 mg IV. O2 sat 86%. 1200 Assessment complete.  at bedside. Dr. Jonathan Hanson in to see. 1330 Discussed continuing chemotherapy with family (, son, and daughter).  Dr. Mariah Claudio said to continue chemotherapy until the family says stop. Family in agreement to discontinue chemotherapy. 1400 Dr. Melonie Spears back in to see. O2 sat 82% RR 37. Family at bedside. [address] in to see. 0 Dr. Maday London in again. Discussed situation with the family. The family does not want to prolong her suffering. The want to remove the mask. They want to make very sure she is comfortable first.    1615 Family at bedside. RR 50s, Os sat. 79%  1622 Medicated with Hydromorphone 4 mg slow IVP and lorazepam 1 mg slow IVP. RR slowed to the 25s.  1636 Family concerned she is still breathing hard. Medicated with hydromorphone 2 mg slow IVP per the families request.  1640 Bi-pap mask removed. Family at bedside. 1645 Agonal respirations. Family present. 1650 No longer breathing. Asystole on monitor. Dr. Maday London still present. Spoke to family and pronounced patient .

## 2018-06-01 NOTE — PROGRESS NOTES
1730: REFERRAL OF POTENTIAL ORGAN DONOR  Patient ; call [phone] with time of death; complete Record of Death. Call placed at [address]. Pt not a candidate for tissue donation per Tatyana Bacon representative. [facility]. Report given to primary RN Steven Dixon. 175: Call received from Berta Herzog. Pt not an eye donor candidate. Ok to release pt.

## 2018-06-01 NOTE — PROGRESS NOTES
1730- Patient arrived to ICU, placed on overhead monitors. Patient alert and oriented x3. Occasional confusion/forgetfulness. Patient dyspneic at rest, RT notified for PRN breathing treatment. [phone] Patient states breathing feels improved. Patient noted to be diaphoretic, ST elevation noticed on monitor. EKG obtained and sent to CCU. Family at bedside. 1830- Code STEMI initiated. Dr. Sigifredo Pavon at bedside. Orders received, labs sent. 1917- 1u Platelets started. 80- Dr. Federico Stewart at bedside, orders received to place patient on Bipap. RT notified. Bedside and Verbal shift change report given to [address] (oncoming nurse) by Simona Saleem (offgoing nurse). Report included the following information SBAR, Kardex, Intake/Output, MAR and Recent Results.

## 2018-06-01 NOTE — ACP (ADVANCE CARE PLANNING)
Advance Care Planning Note    Name: Renetta Vaughn  YOB: 1945  MRN: [id number]  Admission Date: 5/28/2018  1:28 AM    Date of discussion: 6/1/2018    Active Diagnoses:    Hospital Problems  Date Reviewed: 5/29/2018          Codes Class Noted POA    * (Principal)AML (acute myeloid leukemia) ([facility] ICD-10-CM: C92.00  ICD-9-CM: 205.00  5/31/2018 Unknown        [facility] ([facility] ICD-10-CM: C95.90  ICD-9-CM: 208.90  5/28/2018 Unknown               These active diagnoses are of sufficient risk that focused discussion on advance care planning is indicated in order to allow the patient to thoughtfully consider personal goals of care, and if situations arise that prevent the ability to personally give input, to ensure appropriate representation of their personal desires for different levels and aggressiveness of care. Discussion:     Persons present and participating in discussion: Renetta Vaughn s  and daughter ,  Sherrie Nobles MD .     Discussion: talked in details to the  bedside and daughter later joined the conversation and explained that patient is worsening on bipap and despite of the dobutamine , patient is  struggling to breathe , prognosis is extremely grim at this point and comfort care is more appropriate .  doesnot want her to suffer but according to the daughter the plan is to  Continue dobutamine for few hours per Dr Aysha Olguin and then reassess . Their family member is part of hospice team and she is aware of current situation and will help them for transition later on . Time Spent:     Total time spent face-to-face in education and discussion: 30  minutes.      Sherrie Nobles MD  6/1/2018  3:12 PM

## 2018-06-01 NOTE — PROCEDURES
PICC Placement Note    PRE-PROCEDURE VERIFICATION  Correct Procedure: yes  Correct Site:  yes  Temperature: Temp: 99.1 °F (37.3 °C), Temperature Source: Temp Source: Axillary  Recent Labs      06/01/18   0512   05/31/18   0840   BUN  40*   < >  27*   CREA  0.97   < >  0.68   PLT  36*   < >  21*   INR   --    --   1.3*   WBC  9.9   < >  9.7    < > = values in this interval not displayed. Allergies: Codeine and Sulfa (sulfonamide antibiotics)  Education materials, including PICC Booklet, for PICC Care given to patient: yes. See Patient Education activity for further details. PROCEDURE DETAIL  A triple lumen PICC line was started for vascular access. The following documentation is in addition to the PICC properties in the lines/airways flowsheet :  Lot #: [id number]  Was xylocaine 1% used intradermally:  yes  Catheter Length: 34 (cm)  Vein Selection for PICC:right basilic  Central Line Bundle followed yes  Complication Related to Insertion: none    The placement was verified by ECG/Sapiens technology: The  tip location is on the right side and the tip is in the  superior vena cava. See ECG results for PICC tip placement. Report given to nurse    Kimberlee Gerber is okay to use.     Maureen Carty RN

## 2018-06-01 NOTE — PROGRESS NOTES
Spiritual Care Assessment/Progress Note  [facility]      NAME: Michael Dodge      MRN: [id number]  AGE: 68 y.o. SEX: female  Baptism Affiliation: Druze   Language: English     6/1/2018     Total Time (in minutes): 70     Spiritual Assessment begun in [address] [facility] through conversation with:         [x]Patient        [x] Family    [] Friend(s)        Reason for Consult: Request by staff     Spiritual beliefs: (Please include comment if needed)     [x] Identifies with a pilo tradition:     [x] Supported by a pilo community:      [] Claims no spiritual orientation:      [] Seeking spiritual identity:           [] Adheres to an individual form of spirituality:      [] Not able to assess:                     Identified resources for coping:      [x] Prayer                               [] Music                  [] Guided Imagery     [x] Family/friends                 [] Pet visits     [] Devotional reading                         [] Unknown     [x] Other: clergy                                            Interventions offered during this visit: (See comments for more details)    Patient Interventions: Catharsis/review of pertinent events in supportive environment, Iconic (affirming the presence of God/Higher Power), Prayer (assurance of)     Family/Friend(s):  Affirmation of emotions/emotional suffering, Affirmation of pilo, Catharsis/review of pertinent events in supportive environment, Iconic (affirming the presence of God/Higher Power), Normalization of emotional/spiritual concerns, Prayer (actual), Baptism beliefs/image of God discussed     Plan of Care:     [x] Support spiritual and/or cultural needs    [] Support AMD and/or advance care planning process      [x] Support grieving process   [] Coordinate Rites and/or Rituals    [] Coordination with community clergy   [] No spiritual needs identified at this time   [] Detailed Plan of Care below (See Comments)  [] Make referral to Music Therapy  [] Make referral to Pet Therapy     [] Make referral to Addiction services  [] Make referral to [facility]  [] Make referral to Spiritual Care Partner  [] No future visits requested        [] Follow up visits as needed     Comments: Responded to request from Nurse Derrick Holliday to support  and family of patient. Arrived at ICU 14 to find a number of people in and around the room. Struck up conversation with  of 54 years, Jaja Lagunas, who welcomed  visit. Noted his general sense of anxiety and repeated complaint of abruptly being told of his wife's immanent decline. Also fixated on her immediate physical needs. Offered words of affirmation and sought to shift his focus from her physical needs to his capacity to be a helpful presence to her in her time of need. Also ministered to daughter Esperanza Higgins and son, and a number of other relatives and friends. Was able to address Kit separately in waiting room---encouraged him to share his feeling of helplessness which led to tears. Also shared spoken prayer, which also allowed for and expression of anticipatory grief. General sense of anxiety among family and friends. Son tends toward role of peace-maker and appears to be most rational under stress. Talked to several family members, discouraging high volume of visitors to the room to discourage over-engagement while Josette's condition is so compromised. Columba Ralph from [address] arrived and also proceeded to offer support. Provided  contact information to son and daughter and will revisit as able later today. [facility]'s Staff  (Gautam Adame Patient Care Specialist)   Paging Service [phone])

## 2018-06-01 NOTE — PROGRESS NOTES
[facility] Liaison note:    Quincy Bowels to ICU to support family. Family surrounding bedside. Made decision to remove the mask and allow family time to say goodbye. Family asked for . Notified  and he came to bedside. Mask was removed and patient is now . Thank you for the opportunity to serve this wonderful family.     Brooks Brian, RN  [facility]

## 2018-06-01 NOTE — CONSULTS
[address]    Alyssa Orozco  MR#: [id number]  : 1945  ACCOUNT #: [de-identified]   DATE OF SERVICE: 2018    HISTORY OF PRESENT ILLNESS:  This 78-year-old woman has known coronary artery disease with previous catheterization interventions by Dr. Ronda Briones last performed 2014, a stent in the distal LAD was open; there was balloon angioplasty distal to the stent; she had stents placed in the circumflex OM1 and 2, doing well. She was admitted to the hospital 2018 with rib pain; 2-3 week history of generalized joint aches and pains, knees, right clavicle; sweats over several weeks. Her lab studies showed an abnormal CBC with blasts, significant thrombocytopenia and it was felt that she had acute leukemia. This has been confirmed, she received her first dose of Vidaza today. I's and O's are not particularly positive, but she is short of breath, has what appears to me to be pulmonary edema on chest x-ray. ST elevation was noted on a monitoring lead. EKG performed at 1620 shows sinus rhythm with ST elevation I, aVL, lead II with a reciprocal change in lead III, new compared to admitting EKG. She is sweaty and clammy, but not having chest pain, somewhat improved with IV Lasix and nitrates. There is no nausea. She is alert and coherent with an adequate blood pressure at this time and diuresing again with IV Lasix. Of concern is labs from earlier this morning in which her platelet count starting out at 58,000 when admitted had dropped down to 21,000, this was before the chemotherapy. Hemoglobin 9.4, white count 9700. INR today was 1.3, PTT 27.5. Fibrinogen elevated at 675. D-dimer elevated at 5.23. We are planning to treat her medically. Aspirin and prasugrel were stopped with her dropping platelet count.     PAST MEDICAL HISTORY:  Had some rapid heart beating from Novocain, previous stroke, anxiety issues, history of kidney stones, elevated cholesterol, hypertension, type 2 diabetes mellitus, basal cell cancers, coronary artery disease with prior stenting to the LAD and circumflex systems by Dr. Augie Robles, and preserved LV function. She has had tonsillectomy, skin cancer removed, hemorrhoidectomy, fissure repair, cholecystectomy, back surgery, cataract implants, cholecystectomy and multiple colonoscopies. CURRENT MEDICATIONS:  Tylenol p.r.n., albumin q.6h., the Vidaza was given earlier today 95 mg first dose, carvedilol 3.125 twice daily, Decadron 10 mg starting today, furosemide was just given this evening, Kytril injection 1 mg q.24, Humalog sliding scale, Levaquin 750 IV q.24h., Lidoderm patch, Cozaar 50 mg daily, Nitro-Bid ointment 1 inch applied this evening, Protonix 40 mg a day, Zosyn 4.5 mg, Colace, sertraline 100 twice a day, vancomycin per Pharmacy dosing. ALLERGIES:  CODEINE, SULFA. FAMILY HISTORY:  Positive for cancer, lymphoma and colon. SOCIAL HISTORY:  , very attentive family. Never smoked. No alcohol. REVIEW OF SYSTEMS:  Otherwise, noncontributory. PHYSICAL EXAMINATION:  GENERAL:  Elderly woman, alert, coherent, sweaty and clammy. Denies chest pain. VITAL SIGNS:  Blood pressure 108/67, pulse 96 and regular, afebrile, sat 90% with 6 liters per minute. HEENT:  There is  no JVD. Carotids full without bruits. LUNGS:  Show scant crackles. HEART:  Regular rate and rhythm, but no murmur. No definite gallop. Tones are distant. ABDOMEN:  Somewhat obese without obvious mass or organomegaly. EXTREMITIES:  Trace edema. Pedal pulses are intact. SKIN:  Intact. MUSCULOSKELETAL:  No skeletal deformities. NEUROLOGIC:  Nonfocal.    LABORATORY DATA:  As indicated with a sodium 134, potassium 3.7, chloride 101, creatinine 0.68. ASSESSMENT:  1. Acute lateral myocardial infarction with underlying coronary artery disease; previous interventions to the distal LAD, circumflex OM1 and OM2.  2. Infiltrates on chest x-ray. Suspect a component of pulmonary edema, certainly could have underlying pneumonia. 3.  Acute myelogenous leukemia with severe thrombocytopenia markedly increasing her risk for intervention. 4.  Chronic anxiety. 5.  Type 2 diabetes. 6.  Hypertension. PLAN/RECOMMENDATIONS:  Treat medically. I think options for aggressive interventions are not appropriate.       MD Karri Howell [facility] / [place]  D: 05/31/2018 19:33     T: 05/31/2018 20:33  JOB #: [id number]

## 2018-06-01 NOTE — CONSULTS
Palliative Medicine Consult  [place]: [phone])    Patient Name: Nayla Dickens  YOB: 1945    Date of Initial Consult: June 1, 2018  Reason for Consult: Care Decisions  Requesting Provider: Dr. Ayan Otero   Primary Care Physician: Susana Tracy MD     SUMMARY:   Nayla Dickens is a 68 y.o. unfortunate female with a past history of CAD, HTN, DM, basal cell CA, anxiety, kidney stones, elevated cholesterol, CVA, CKD, cardiac stents, cholecystectomy, who was arrived to the ED on 5/28/2018 from home with c/o left rib pain   For 3.5 days with associated chest pain and significant pain with palpation of her ribs. Pt found to have AML. Admission further complicated by NSTEMI, pneumonia, anemia, thrombocytopenia, pulmonary edema, and CHF exacerbation. Pt rapidly declining and supported on bipap. Family elected DNR status. Current medical issues leading to Palliative Medicine involvement include: family struggling with imminent death of the patient. We have been asked to support with care decisions. Social:  55 years, 2 children, 3 grandsons, homemaker, [employer] [place]     PALLIATIVE DIAGNOSES:   1. Shortness of breath  2. Agitation   3. Pneumonia  4. Dying Care     PLAN:   1. Met with the patient's family (dil~Gabriella German, spouse Nick Sprague, dtr, Hilario Pena, son, Nick Sprague)  2. Family is struggling with difficult decisions lying ahead. They are waiting for Dr. Syed Madsen to guide them on what to do. ... 3. The pt is imminent and not expected to survive the next 24 hours    Dr. Syed Madsen arrived and supported the family with the decision to transition off the bipap to full comfort measures. All questions and concerns addressed    4. Comfort orders placed. Pt will not survive to make it to hospice  5. Pastoral care contacted  6. Family gathered  7. Initial consult note routed to primary continuity provider  8.  Communicated plan of care with: Palliative IDT       GOALS OF CARE / TREATMENT PREFERENCES:     GOALS OF CARE:  Patient/Health Care Proxy Stated Goals: Comfort      TREATMENT PREFERENCES:   Code Status: DNR    Advance Care Planning:  Advance Care Planning 5/28/2018   Patient's Healthcare Decision Maker is: Verbal statement (Legal Next of Kin remains as decision maker)   Primary Decision Maker Name Margarette Henao   Primary Decision Maker Phone Number [phone]   Primary Decision Maker Relationship to Patient Adult child   Confirm Advance Directive Yes, not on file       Medical Interventions: Comfort measures   Other Instructions:   Artificially Administered Nutrition: No feeding tube     Other:    As far as possible, the palliative care team has discussed with patient / health care proxy about goals of care / treatment preferences for patient.      HISTORY:     History obtained from: family, cardiologist, nursing    CHIEF COMPLAINT: unresponsive    HPI/SUBJECTIVE:    The patient is:   [] Verbal and participatory  [x] Non-participatory due to:   Medical condition     Clinical Pain Assessment (nonverbal scale for severity on nonverbal patients):   Clinical Pain Assessment  Severity: 0          Duration: for how long has pt been experiencing pain (e.g., 2 days, 1 month, years)  Frequency: how often pain is an issue (e.g., several times per day, once every few days, constant)     FUNCTIONAL ASSESSMENT:     Palliative Performance Scale (PPS):  PPS: 10       PSYCHOSOCIAL/SPIRITUAL SCREENING:     Palliative IDT has assessed this patient for cultural preferences / practices and a referral made as appropriate to needs (Cultural Services, Patient Advocacy, Ethics, etc.)    Advance Care Planning:  Advance Care Planning 5/28/2018   Patient's Healthcare Decision Maker is: Verbal statement (Legal Next of Kin remains as decision maker)   Primary Decision Maker Name Margarette Henao   Primary Decision Maker Phone Number [phone]   Primary Decision Maker Relationship to Patient Adult child   Confirm Advance Directive Yes, not on file       Any spiritual / Orthodox concerns:  [] Yes /  [x] No    Caregiver Burnout:  [] Yes /  [x] No /  [] No Caregiver Present      Anticipatory grief assessment:   [x] Normal  / [] Maladaptive       ESAS Anxiety: Anxiety: 5    ESAS Depression:          REVIEW OF SYSTEMS:     Positive and pertinent negative findings in ROS are noted above in HPI. The following systems were [x] reviewed objectively  / [] unable to be reviewed as noted in HPI  Other findings are noted below. Systems: constitutional, ears/nose/mouth/throat, respiratory, gastrointestinal, genitourinary, musculoskeletal, integumentary, neurologic, psychiatric, endocrine. Positive findings noted below. Modified ESAS Completed by: provider   Fatigue: 10 Drowsiness: 10     Pain: 0   Anxiety: 5       Dyspnea: 10                    PHYSICAL EXAM:     From RN flowsheet:  Wt Readings from Last 3 Encounters:   05/31/18 174 lb 9.6 oz (79.2 kg)   09/15/16 175 lb 5 oz (79.5 kg)   02/22/14 180 lb (81.6 kg)     Blood pressure 129/74, pulse (!) 112, temperature 99.1 °F (37.3 °C), resp. rate (!) 40, height 5' 9\" (1.753 m), weight 174 lb 9.6 oz (79.2 kg), SpO2 93 %, not currently breastfeeding.     Pain Scale 1: Numeric (0 - 10)  Pain Intensity 1: 0  Pain Onset 1: one week ago  Pain Location 1: Generalized  Pain Orientation 1: Left  Pain Description 1: Aching  Pain Intervention(s) 1: Medication (see MAR)  Last bowel movement, if known:     Constitutional: ill appearing  Eyes: pupils equal, anicteric  ENMT: no nasal discharge, moist mucous membranes  Cardiovascular: irregular rhythm  Respiratory: breathing rapid and labored with bipap, symmetric  Gastrointestinal:  Musculoskeletal: no deformity, no tenderness to palpation  Skin: warm, dry  Neurologic: unable to assess  Psychiatric:  Other:       HISTORY:     Principal Problem:    AML (acute myeloid leukemia) (HCC) (5/31/2018)    Active Problems:    Leukemia ([facility] [address] (5/28/2018)      Past Medical History:   Diagnosis Date    CAD (coronary artery disease)     Cancer ([facility] [address]     basal cell cancer face arm and chest    Chronic kidney disease     kidney stones    Diabetes ([facility] [address] [facility]     Hypertension     Other ill-defined conditions(799.89)     hx of kidney stones    Other ill-defined conditions(799.89)     elevated cholesterol    Psychiatric disorder     anxiety    Stroke ([facility] [address]     Unspecified adverse effect of anesthesia     ? novacaine rapid heart beat      Past Surgical History:   Procedure Laterality Date    ABDOMEN SURGERY PROC UNLISTED      COLONOSCOPY N/A 9/15/2016    COLON[facility]legas [facility] ENDOSCOPY    COLONOSCOPY,DIAGNOSTIC  9/15/2016         HX CHOLECYSTECTOMY      HX HEART CATHETERIZATION      x2 cardiac stents    HX HEENT      x4 sinus surgeries    HX HEENT      cataracts and implants    HX ORTHOPAEDIC      broke back - \"cement\" in back    HX OTHER SURGICAL      skin cancer removed face arms neck    HX OTHER SURGICAL      hemorrhoidectomy and fissure repair    HX OTHER SURGICAL      colonoscopy    HX OTHER SURGICAL      gall bladder removed    HX TONSILLECTOMY      CO COLONOSCOPY FLX DX W/COLLJ SPEC WHEN PFRMD  2/26/2013           Family History   Problem Relation Age of Onset    Cancer Father      colon cancer    Cancer Mother      lymphoma      History reviewed, no pertinent family history.   Social History   Substance Use Topics    Smoking status: Never Smoker    Smokeless tobacco: Never Used    Alcohol use No     Allergies   Allergen Reactions    Codeine Nausea Only    Sulfa (Sulfonamide Antibiotics) Nausea Only      Current Facility-Administered Medications   Medication Dose Route Frequency    furosemide (LASIX) injection 80 mg  80 mg IntraVENous Q12H    sodium chloride (NS) flush 20 mL  20 mL InterCATHeter PRN    alteplase (CATHFLO) 1 mg in sterile water (preservative free) 1 mL injection  1 mg InterCATHeter PRN    bacitracin 500 unit/gram packet 1 Packet  1 Packet Topical PRN    dexmedeTOMidine (PRECEDEX) 400 mcg in 0.9% sodium chloride 100 mL infusion  0.2-1.4 mcg/kg/hr IntraVENous TITRATE    LORazepam (ATIVAN) injection 1 mg  1 mg IntraVENous Q30MIN PRN    HYDROmorphone (DILAUDID) injection 4 mg  4 mg IntraVENous ONCE    LORazepam (ATIVAN) 2 mg/mL injection        HYDROmorphone (DILAUDID) injection 2 mg  2 mg IntraVENous Q5MIN PRN    glycopyrrolate (ROBINUL) injection 0.2 mg  0.2 mg IntraVENous Q4H PRN    albuterol-ipratropium (DUO-NEB) 2.5 MG-0.5 MG/3 ML  3 mL Nebulization Q6H PRN    0.9% sodium chloride infusion 250 mL  250 mL IntraVENous PRN    sodium chloride (NS) flush 5-10 mL  5-10 mL IntraVENous PRN    naloxone (NARCAN) injection 0.4 mg  0.4 mg IntraVENous PRN    diphenhydrAMINE (BENADRYL) injection 12.5 mg  12.5 mg IntraVENous Q4H PRN    ondansetron (ZOFRAN ODT) tablet 4 mg  4 mg Oral Q4H PRN    glucose chewable tablet 16 g  4 Tab Oral PRN    dextrose (D50W) injection syrg 12.5-25 g  12.5-25 g IntraVENous PRN    glucagon (GLUCAGEN) injection 1 mg  1 mg IntraMUSCular PRN    lidocaine (LIDODERM) 5 % patch 2 Patch  2 Patch TransDERmal Q24H    acetaminophen (TYLENOL) tablet 650 mg  650 mg Oral Q6H          LAB AND IMAGING FINDINGS:     Lab Results   Component Value Date/Time    WBC 9.9 06/01/2018 05:12 AM    HGB 7.9 (L) 06/01/2018 05:12 AM    PLATELET 36 (LL) 95/08/7440 05:12 AM     Lab Results   Component Value Date/Time    Sodium 139 06/01/2018 05:12 AM    Potassium 3.3 (L) 06/01/2018 05:12 AM    Chloride 101 06/01/2018 05:12 AM    CO2 23 06/01/2018 05:12 AM    BUN 40 (H) 06/01/2018 05:12 AM    Creatinine 0.97 06/01/2018 05:12 AM    Calcium 8.2 (L) 06/01/2018 05:12 AM    Magnesium 1.7 06/01/2018 05:12 AM    Phosphorus 3.7 05/31/2018 08:40 AM      Lab Results   Component Value Date/Time    AST (SGOT) 56 (H) 05/29/2018 01:20 AM    Alk. phosphatase 100 05/29/2018 01:20 AM    Protein, total 6.4 05/29/2018 01:20 AM    Albumin 3.2 (L) 05/29/2018 01:20 AM    Globulin 3.2 05/29/2018 01:20 AM     Lab Results   Component Value Date/Time    INR 1.3 (H) 05/31/2018 08:40 AM    Prothrombin time 13.4 (H) 05/31/2018 08:40 AM    aPTT 27.5 05/31/2018 08:40 AM      Lab Results   Component Value Date/Time    Iron 88 05/28/2018 05:45 PM    TIBC 275 05/28/2018 05:45 PM    Iron % saturation 32 05/28/2018 05:45 PM      No results found for: PH, PCO2, PO2  No components found for: [place]   Lab Results   Component Value Date/Time     06/01/2018 05:12 AM    CK - MB 7.1 (H) 06/01/2018 05:12 AM                Total time:  70 minutes  Counseling / coordination time, spent as noted above: 55 minutes  > 50% counseling / coordination?: y    Prolonged service was provided for  []30 min   []75 min in face to face time in the presence of the patient, spent as noted above. Time Start:   Time End:   Note: this can only be billed with 14793 (initial) or 18937 (follow up). If multiple start / stop times, list each separately.

## 2018-06-01 NOTE — PROGRESS NOTES
I returned page from nurse who reports patient is refusing to use BIPAP (despite having low O2 sats in the 70s when off BIPAP). Nurse reports that patient is A&OX3. Patient has h/o anxiety and she remains anxious and uncooperative tonight. I ordered stat ABG to check her acid-base status and would like to consult pulmonologist / intensivist for other options such as high flow. Chest xray portable this morning showed decreased mild interstitial pulmonary edema. right upper lobe pneumonia versus atelectasis. Patient is already on IV antibiotics. Tonight, patient's refusal to use BIPAP is delaying our medical treatments. Today, I would recommend psychiatry consultation for further evaluation.

## 2018-06-01 NOTE — PROGRESS NOTES
Day #2 of Vancomycin  Indication:  HAP  Current regimen:  1250 mg IV Q 12 hr  Abx regimen:  Levaquin + Zosyn + Vancomycin  ID Following ?: NO  Concomitant nephrotoxic drugs (requires more frequent monitoring): Loop diuretics  Frequency of BMP?: x 1 tomorrow    Recent Labs      18   0512  18   2332  18   1915  18   0840   WBC  9.9  10.3  10.2  9.7   CREA  0.97   --   0.64  0.68   BUN  40*   --   32*  27*     Est CrCl: ~50-60 ml/min; UO: ~0.7 ml/kg/hr  Temp (24hrs), Av.9 °F (37.2 °C), Min:97.9 °F (36.6 °C), Max:99.8 °F (37.7 °C)    Cultures:    Blood: NGTD    Goal trough = 15 - 20 mcg/mL    Recent trough history (date/time/level/dose/action taken):  None    Plan: Given the patient's worsening Scr, the dose of vancomycin has been adjusted to 1000 mg IV Q 16 hr.  Pharmacy will continue to monitor patient daily and will make dosage adjustments based upon changing renal function.

## 2018-06-01 NOTE — PROGRESS NOTES
1930: Bedside shift change report given to Maria Dolores Wheeler RN (oncoming nurse) by Karly Peacock RN (offgoing nurse). Report included the following information SBAR, Kardex, ED Summary, Intake/Output, MAR, Recent Results, Med Rec Status, Cardiac Rhythm SR w/ ST elevation and Alarm Parameters . 2000: First unit of platelets infusing. 2115: First unit of platelets complete    2135: Second unit of platelets started. 2150: Pt tolerating platelets well, no adverse rxn noted. 2200: Second unit of platelets complete. 2230: Spoke with Dr. Yuan Vogel, gave update of pt's status, informed cardiology that the pt has converted to Afib. No further txt needed at this time. 65: Paged cardiology for elevated troponin, MD made aware earlier in the shift. No new orders ordered at this time. 46: Paged hospitalist, pt does not want to keep on BiPaP and as a result has desat, 70%-75%. Pt remains alert and oriented x3-4, with periodic confusion, forgetfulness, agitation and restlessness. Recommended high-[place], per hospitalist will obtain ABG and place consult to pulmonary in the morning.      0322: ABG relatively normal beside pO2: 55. Will attempt to continue to use BiPaP, RT increased FiO2 to 80%. If pt continues to take off mask will call pulmonary. 0602: Paged hospitalist for critical platelets, 36. No orders received at this time. Per oncology's note, tranfuse < 15 or <50 if bleeding, pt not bleeding at this time. 8735: Paged pulmonary, new consulted place. Spoke with pulmonary, suggested increasing pressure setting to 15 over 5 on BiPaP and precedex if needed. Pt converted back to SR.     0730: Bedside shift change report given to Scott Jacome RN (oncoming nurse) by Maria Dolores Wheeler RN (offgoing nurse). Report included the following information SBAR, Kardex, ED Summary, Intake/Output, MAR, Recent Results, Med Rec Status, Cardiac Rhythm SR and Alarm Parameters .

## 2018-06-01 NOTE — PROGRESS NOTES
[facility]  [phone]     Patient Name: Ai Worthington  YOB: 1945  Age: 68 y.o. [address] [facility] RN Note:  Hospice consult received, reviewing chart. Will follow up with Unit Nurse and Care Manager to discuss plan of care, patient status and discharge disposition within the hour. Family wanted to try some more measures to make sure they exhausted all of their options before accepting hospice care. Will continue to follow family. Thank you for the opportunity to be of service to this patient.     Eileen Alford RN  [facility]

## 2018-06-01 NOTE — PROGRESS NOTES
Hospitalist Progress Note  Kadeem Jean MD  Answering service: [phone] from in house phone        Date of Service:  2018  NAME:  Naseem Urbano  :  1945  MRN:  [id number]      Admission Summary:   Karen Appiah a 68 y. o. female with PMH including CAD s/p multiple stents, HTN, HLD, NIDDM2, CKD, anxiety, GERD; presenting to the ED with complaint of sharp left side chest pains. Interval history / Subjective:         Patient is doing poorly on bipap , very short of breath and unconscious   BP is low , in 80s . Patient struggling to breathe . Assessment & Plan:     Pneumonia likely HAP,acute hypoxia respiratory failure . -CXR showing worsening bilateral interstitial opacities, most confluent in the right upper lung. Differential includes edema although superimposed infection, especially in  the more confluent area within the right upper lung is possible in the appropriate clinical setting. On  broad spectrum antibiotics with vancomycin,zosyn,Levaquin. Nebs  In  ICU, on bipap   Cardiology on board and have started on dobutamine gtt     patient is now DNR   Please see ACP note         Newly diagnosed acute myeloid leukemia,presented with anemia and thrombocytopenia with blasts in the peripheral blood   -S/p bmbx . Oncology on board. -:started on Vidaza and dexamethasone  Oncology on board .       Uncontrolled pain originating on the upper back radiating to the left axillary and infra mammary area. Appears musculoskeletal.  -No rashes or vesicles   -CTA chest,CXR and ribs xray all negative. Pain control. On flexeril .     Anxiety: she takes lorazepam qhs at home. She tells me she had valium for emotional crisis when her dad . LOrazepam 0.5 mg tid prn.     CAD s/p multiple stents,continue coreg,losartan,on asa,prasugrel on hold due to severe thrombocytopenia/bx procedure. .     HTN,continue home regimen    DM II with hyperglycemia: metformin on hold. Accucheks and Humalog sliding scale AC  &HS    Code status: full   DVT prophylaxis: none     Care Plan discussed with: Patient/Family  Disposition: TBD     Hospital Problems  Date Reviewed: 5/29/2018          Codes Class Noted POA    * (Principal)AML (acute myeloid leukemia) ([facility] ICD-10-CM: C92.00  ICD-9-CM: 205.00  5/31/2018 Unknown        Leukemia ([facility] ICD-10-CM: C95.90  ICD-9-CM: 208.90  5/28/2018 Unknown                Review of Systems:   A comprehensive review of systems was negative except for that written in the HPI. Vital Signs:    Last 24hrs VS reviewed since prior progress note. Most recent are:  Visit Vitals    /74    Pulse (!) 112    Temp 99.1 °F (37.3 °C)    Resp (!) 40    Ht 5' 9\" (1.753 m)    Wt 79.2 kg (174 lb 9.6 oz)    SpO2 93%    Breastfeeding No    BMI 25.78 kg/m2         Intake/Output Summary (Last 24 hours) at 06/01/18 1522  Last data filed at 06/01/18 0700   Gross per 24 hour   Intake           1449.5 ml   Output             1325 ml   Net            124.5 ml        Physical Examination:             Constitutional:  acute resp distress    ENT:  Oral mucous moist, oropharynx benign. Neck supple,    Resp:  crackles , accessory muscle use    CV:  Regular rhythm, normal rate, no murmurs, gallops, rubs    GI:  Soft, non distended, non tender.  normoactive bowel sounds, no hepatosplenomegaly     Musculoskeletal:  No edema, warm, 2+ pulses throughout    Neurologic:  obtunded , not moving extremities            Data Review:    Review and/or order of clinical lab test      Labs:     Recent Labs      06/01/18   0512  05/31/18   2332   WBC  9.9  10.3   HGB  7.9*  8.3*   HCT  24.3*  25.5*   PLT  36*  53*     Recent Labs      06/01/18   0512  05/31/18   1915  05/31/18   0840   NA  139  134*  134*   K  3.3*  3.9  3.7   CL  101  99  101   CO2  23  23  22   BUN  40*  32*  27*   CREA  0.97  0.64  0.68   GLU  202*  189*  180*   CA  8.2*  8.4*  8.6   MG  1.7 --    --    PHOS   --    --   3.7     No results for input(s): SGOT, GPT, ALT, AP, TBIL, TBILI, TP, ALB, GLOB, GGT, AML, LPSE in the last 72 hours. No lab exists for component: AMYP, HLPSE  Recent Labs      05/31/18   0840   INR  1.3*   PTP  13.4*   APTT  27.5      No results for input(s): FE, TIBC, PSAT, FERR in the last 72 hours. Lab Results   Component Value Date/Time    Folate 5.1 05/28/2018 05:35 PM      No results for input(s): PH, PCO2, PO2 in the last 72 hours.   Recent Labs      06/01/18   0512  05/31/18   1831   CPK  120  218*   CKNDX  5.9*  9.1*   TROIQ  3.16*  6.54*     No results found for: CHOL, CHOLX, CHLST, CHOLV, HDL, LDL, LDLC, DLDLP, TGLX, TRIGL, TRIGP, CHHD, CHHDX  Lab Results   Component Value Date/Time    Glucose (POC) 181 (H) 06/01/2018 12:34 PM    Glucose (POC) 210 (H) 06/01/2018 08:09 AM    Glucose (POC) 211 (H) 05/31/2018 10:12 PM    Glucose (POC) 219 (H) 05/31/2018 04:57 PM    Glucose (POC) 218 (H) 05/31/2018 11:25 AM     No results found for: COLOR, APPRN, SPGRU, REFSG, CARA, PROTU, GLUCU, KETU, BILU, UROU, NAGA, LEUKU, GLUKE, EPSU, BACTU, WBCU, RBCU, CASTS, UCRY      Medications Reviewed:     Current Facility-Administered Medications   Medication Dose Route Frequency    furosemide (LASIX) injection 80 mg  80 mg IntraVENous Q12H    DOBUTamine (DOBUTREX) 500 mg/250 mL (2,000 mcg/mL) infusion  5 mcg/kg/min IntraVENous CONTINUOUS    sodium chloride (NS) flush 20 mL  20 mL InterCATHeter PRN    sodium chloride (NS) flush 10 mL  10 mL InterCATHeter Q24H    sodium chloride (NS) flush 10 mL  10 mL InterCATHeter PRN    sodium chloride (NS) flush 10 mL  10 mL InterCATHeter Q8H    alteplase (CATHFLO) 1 mg in sterile water (preservative free) 1 mL injection  1 mg InterCATHeter PRN    bacitracin 500 unit/gram packet 1 Packet  1 Packet Topical PRN    dexmedeTOMidine (PRECEDEX) 400 mcg in 0.9% sodium chloride 100 mL infusion  0.2-1.4 mcg/kg/hr IntraVENous TITRATE    LORazepam (ATIVAN) injection 1 mg  1 mg IntraVENous Q4H PRN    morphine (pf) (DURAMORPH) 1 mg/mL injection 2 mg  2 mg IntraVENous Q1H PRN    [START ON 6/2/2018] vancomycin (VANCOCIN) 1,000 mg in 0.9% sodium chloride (MBP/ADV) 250 mL  1,000 mg IntraVENous Q16H    senna-docusate (PERICOLACE) 8.6-50 mg per tablet 2 Tab  2 Tab Oral DAILY    levoFLOXacin (LEVAQUIN) 750 mg in D5W IVPB  750 mg IntraVENous Q24H    piperacillin-tazobactam (ZOSYN) 4.5 g in 0.9% sodium chloride (MBP/ADV) 100 mL  4.5 g IntraVENous Q8H    Vancomycin - pharmacy to dose   Other Rx Dosing/Monitoring    albuterol-ipratropium (DUO-NEB) 2.5 MG-0.5 MG/3 ML  3 mL Nebulization Q6H PRN    albumin human 25% (BUMINATE) solution 25 g  25 g IntraVENous Q6H    0.9% sodium chloride infusion 250 mL  250 mL IntraVENous PRN    clonazePAM (KlonoPIN) tablet 0.5 mg  0.5 mg Oral Q8H PRN    cyclobenzaprine (FLEXERIL) tablet 10 mg  10 mg Oral TID PRN    pantoprazole (PROTONIX) tablet 40 mg  40 mg Oral DAILY    sertraline (ZOLOFT) tablet 100 mg  100 mg Oral BID    sodium chloride (NS) flush 5-10 mL  5-10 mL IntraVENous Q8H    sodium chloride (NS) flush 5-10 mL  5-10 mL IntraVENous PRN    naloxone (NARCAN) injection 0.4 mg  0.4 mg IntraVENous PRN    diphenhydrAMINE (BENADRYL) injection 12.5 mg  12.5 mg IntraVENous Q4H PRN    ondansetron (ZOFRAN ODT) tablet 4 mg  4 mg Oral Q4H PRN    magnesium hydroxide (MILK OF MAGNESIA) 400 mg/5 mL oral suspension 30 mL  30 mL Oral DAILY PRN    glucose chewable tablet 16 g  4 Tab Oral PRN    dextrose (D50W) injection syrg 12.5-25 g  12.5-25 g IntraVENous PRN    glucagon (GLUCAGEN) injection 1 mg  1 mg IntraMUSCular PRN    insulin lispro (HUMALOG) injection   SubCUTAneous AC&HS    lidocaine (LIDODERM) 5 % patch 2 Patch  2 Patch TransDERmal Q24H    oxyCODONE IR (ROXICODONE) tablet 10 mg  10 mg Oral Q4H PRN    acetaminophen (TYLENOL) tablet 650 mg  650 mg Oral Q6H ______________________________________________________________________  EXPECTED LENGTH OF STAY: 2d 21h  ACTUAL LENGTH OF STAY:          Maicol Horn MD

## 2018-06-01 NOTE — DIABETES MGMT
DTC Progress Note    Recommendations/ Comments: Chart reviewed due to hyperglycemia. Blood sugars mainly >180 and pt has required 14 units of correction over the last 24 hours. If appropriate, please consider changing correction scale to resistant scale. Current hospital DM medication: correction scale Humalog, normal sensitivity, Dexamethasone 10 mg IV daily    Chart reviewed on [facility]. Patient is a 68 y.o. female with known diabetes on Metformin 1000 mg BID at home. A1c:   Lab Results   Component Value Date/Time    Hemoglobin A1c 6.5 (H) 11/22/2013 11:23 AM       Recent Glucose Results:   Lab Results   Component Value Date/Time     (H) 06/01/2018 05:12 AM     (H) 05/31/2018 07:15 PM    GLUCPOC 181 (H) 06/01/2018 12:34 PM    GLUCPOC 210 (H) 06/01/2018 08:09 AM    GLUCPOC 211 (H) 05/31/2018 10:12 PM        Lab Results   Component Value Date/Time    Creatinine 0.97 06/01/2018 05:12 AM     Estimated Creatinine Clearance: 54.8 mL/min (based on Cr of 0.97). Active Orders   Diet    DIET DIABETIC CONSISTENT CARB Regular; 2 GM NA (House Low NA)        PO intake:   Patient Vitals for the past 72 hrs:   % Diet Eaten   05/31/18 1333 50 %   05/29/18 1802 100 %       Will continue to follow as needed.     Thank you  Ross Woods RD, CDE

## 2018-06-01 NOTE — HOSPICE
On Call MSW contacted to provide emotional support for pt's family who are at pt's bedside preparing for final goodbyes. Pt  peacefully when mask removed. Pronounced at 4:50 pm by Abhinav Siegel MD.     Pt's spouse, Lemuel Ann ( 54 years), son, daughter and other family members at bedside grieving appropriately. Hospital  and MD also present providing family with much support. Family member (Baldo Silvestre.) who is a hospice nurse present providing much emotional support, comfort and information to family which appears to be very helpful to the family. Myra Levi from [facility]) visited earlier and provided prayer and support. [facility]  Home will be used for final arrangements. Bereavement risk assessed to be moderate as pt's decline and death has been very fast not affording family with much time to emotionally prepare for it.

## 2018-06-01 NOTE — PROGRESS NOTES
Encountered son Jung Montero in ICU waiting area. He provided an update on his mother's condition. Noted support being provided his father by others--he became once again tearful in conversation. Assured El of continued  availability. [address] [facility]'s Staff  (Gautam Adame Patient Care Specialist)   Paging Service [phone])

## 2018-06-01 NOTE — PROGRESS NOTES
Problem: Falls - Risk of  Goal: *Absence of Falls  Document Valerie Fall Risk and appropriate interventions in the flowsheet. Outcome: Progressing Towards Goal  Fall Risk Interventions:  Mobility Interventions: Communicate number of staff needed for ambulation/transfer    Mentation Interventions: Evaluate medications/consider consulting pharmacy    Medication Interventions: Evaluate medications/consider consulting pharmacy    Elimination Interventions: Patient to call for help with toileting needs    History of Falls Interventions: Evaluate medications/consider consulting pharmacy        Problem: Pressure Injury - Risk of  Goal: *Prevention of pressure injury  Document Didier Scale and appropriate interventions in the flowsheet. Pressure Injury Interventions:             Activity Interventions: Pressure redistribution bed/mattress(bed type)    Mobility Interventions: Pressure redistribution bed/mattress (bed type)    Nutrition Interventions: Document food/fluid/supplement intake    Friction and Shear Interventions: HOB 30 degrees or less, Lift sheet, Minimize layers

## 2018-06-02 LAB
ATRIAL RATE: 97 BPM
ATRIAL RATE: 98 BPM
ATRIAL RATE: 99 BPM
CALCULATED P AXIS, ECG09: 37 DEGREES
CALCULATED P AXIS, ECG09: 40 DEGREES
CALCULATED R AXIS, ECG10: -25 DEGREES
CALCULATED R AXIS, ECG10: 0 DEGREES
CALCULATED R AXIS, ECG10: 0 DEGREES
CALCULATED T AXIS, ECG11: -23 DEGREES
CALCULATED T AXIS, ECG11: 17 DEGREES
CALCULATED T AXIS, ECG11: 3 DEGREES
DIAGNOSIS, 93000: NORMAL
P-R INTERVAL, ECG05: 182 MS
P-R INTERVAL, ECG05: 192 MS
Q-T INTERVAL, ECG07: 340 MS
Q-T INTERVAL, ECG07: 340 MS
Q-T INTERVAL, ECG07: 348 MS
QRS DURATION, ECG06: 68 MS
QRS DURATION, ECG06: 78 MS
QRS DURATION, ECG06: 86 MS
QTC CALCULATION (BEZET), ECG08: 431 MS
QTC CALCULATION (BEZET), ECG08: 436 MS
QTC CALCULATION (BEZET), ECG08: 444 MS
VENTRICULAR RATE, ECG03: 97 BPM
VENTRICULAR RATE, ECG03: 98 BPM
VENTRICULAR RATE, ECG03: 99 BPM

## 2018-06-05 LAB
BACTERIA SPEC CULT: NORMAL
SERVICE CMNT-IMP: NORMAL

## 2018-06-14 NOTE — DISCHARGE SUMMARY
Discharge Summary       PATIENT ID: Kanwal Jones  MRN: [id number]   YOB: 1945    DATE OF ADMISSION: 5/28/2018  1:28 AM    DATE OF DISCHARGE:    PRIMARY CARE PROVIDER: Brett Bryan MD     ATTENDING PHYSICIAN:   DISCHARGING PROVIDER: Morris Chang MD    To contact this individual call [phone] and ask the  to page. If unavailable ask to be transferred the Adult Hospitalist Department. CONSULTATIONS: IP CONSULT TO ONCOLOGY  IP CONSULT TO PALLIATIVE CARE - PROVIDER    PROCEDURES/SURGERIES: * No surgery found *    ADMITTING [address] [place] [address] COURSE:   Kanwal Jones is a 68 y.o. female with PMH including CAD s/p multiple stents, HTN, HLD, NIDDM2, CKD, anxiety, GERD; presenting to the ED with complaint of sharp left side chest pains.     Patient reports 2-3 week history of generalized joint aches and pains, specifically knees and right clavicle. Was told by PCP it was probably arthritis. Also reporting problems with intermittent night sweats over the last several weeks, bad enough she is soaking through the sheets. The reason she came to the ED today was for 3-4 day history intermittent sharp left side chest wall pains. Described as stabbing, 10/10 intensity, worse with deep inspiration / movement / or palpation, non-exertional. Feels like she can't breathe as deep as usual because of the pain. Admits to doing some spring cleaning recently -- moving luggage around a few weeks ago and also moving lots of heavy winter clothes up to the attic last week. Specifically says she was carrying piles of clothing over her left arm and it was very heavy. Denies any trauma or falls. Denies any history of fevers, chills, dizziness, syncope/near-syncope, unintentional weight loss, abdominal pain, N/V/D, bleeding gums, blood in stools, swelling, or other issues.     In the ED -- VSS. Not hypoxic, but placed on 2L NC for comfort.  D-dimer was elevated, so CTA chest was obtained and found negative for PE or other acute process. Rib XR negative for fracture. Troponin x2 negative and EKG w/ NSR, nonischemic. CBC incidentally found to be abnormal with evidence of \"an emerging acute leukemia\" per path report. Noted blast cells, mild neutropenia (WBC 5.9, ANC 1.1K), normocytic anemia (Hg 10.5), and thrombocytopenia (plt 58). Oncology has already been consulted and planning bone marrow biopsy tomorrow.         Pneumonia likely HAP,acute hypoxia respiratory failure . -CXR showing worsening bilateral interstitial opacities, most confluent in the right upper lung. Differential includes edema although superimposed infection, especially in  the more confluent area within the right upper lung is possible in the appropriate clinical setting. On  broad spectrum antibiotics with vancomycin,zosyn,Levaquin. Nebs  In  ICU, on bipap   Cardiology on board and have started on dobutamine gtt     patient is now DNR   Please see ACP note           Newly diagnosed acute myeloid leukemia,presented with anemia and thrombocytopenia with blasts in the peripheral blood   -S/p bmbx . Oncology on board. -:started on Vidaza and dexamethasone  Oncology on board .       Uncontrolled pain originating on the upper back radiating to the left axillary and infra mammary area. Appears musculoskeletal.  -No rashes or vesicles   -CTA chest,CXR and ribs xray all negative. Pain control. On flexeril .      Anxiety: she takes lorazepam qhs at home. She tells me she had valium for emotional crisis when her dad . LOrazepam 0.5 mg tid prn.      CAD s/p multiple stents,continue coreg,losartan,on asa,prasugrel on hold due to severe thrombocytopenia/bx procedure. .      HTN,continue home regimen     DM II with hyperglycemia: metformin on hold. Accucheks and Humalog sliding scale AC  &HS       Patient subsequently passed away     Signed:   Kingsley Gallegos MD  2018  3:33 PM
